# Patient Record
Sex: MALE | Race: WHITE | NOT HISPANIC OR LATINO | Employment: FULL TIME | URBAN - METROPOLITAN AREA
[De-identification: names, ages, dates, MRNs, and addresses within clinical notes are randomized per-mention and may not be internally consistent; named-entity substitution may affect disease eponyms.]

---

## 2021-10-07 ENCOUNTER — OFFICE VISIT (OUTPATIENT)
Dept: FAMILY MEDICINE CLINIC | Facility: CLINIC | Age: 31
End: 2021-10-07

## 2021-10-07 VITALS
OXYGEN SATURATION: 97 % | TEMPERATURE: 97.7 F | HEART RATE: 95 BPM | SYSTOLIC BLOOD PRESSURE: 148 MMHG | WEIGHT: 264.2 LBS | DIASTOLIC BLOOD PRESSURE: 91 MMHG | HEIGHT: 71 IN | BODY MASS INDEX: 36.99 KG/M2

## 2021-10-07 DIAGNOSIS — F32.A ANXIETY AND DEPRESSION: ICD-10-CM

## 2021-10-07 DIAGNOSIS — F41.9 ANXIETY AND DEPRESSION: ICD-10-CM

## 2021-10-07 DIAGNOSIS — Z23 NEED FOR INFLUENZA VACCINATION: ICD-10-CM

## 2021-10-07 DIAGNOSIS — R03.0 ELEVATED BP WITHOUT DIAGNOSIS OF HYPERTENSION: ICD-10-CM

## 2021-10-07 DIAGNOSIS — Z72.0 TOBACCO USE: Primary | ICD-10-CM

## 2021-10-07 DIAGNOSIS — Z00.00 PREVENTATIVE HEALTH CARE: ICD-10-CM

## 2021-10-07 DIAGNOSIS — F90.0 ATTENTION DEFICIT HYPERACTIVITY DISORDER (ADHD), PREDOMINANTLY INATTENTIVE TYPE: ICD-10-CM

## 2021-10-07 DIAGNOSIS — L02.91 ABSCESS: ICD-10-CM

## 2021-10-07 DIAGNOSIS — R07.89 OTHER CHEST PAIN: ICD-10-CM

## 2021-10-07 PROCEDURE — 80053 COMPREHEN METABOLIC PANEL: CPT | Performed by: NURSE PRACTITIONER

## 2021-10-07 PROCEDURE — 90471 IMMUNIZATION ADMIN: CPT | Performed by: NURSE PRACTITIONER

## 2021-10-07 PROCEDURE — 85027 COMPLETE CBC AUTOMATED: CPT | Performed by: NURSE PRACTITIONER

## 2021-10-07 PROCEDURE — 99204 OFFICE O/P NEW MOD 45 MIN: CPT | Performed by: NURSE PRACTITIONER

## 2021-10-07 PROCEDURE — 86803 HEPATITIS C AB TEST: CPT | Performed by: NURSE PRACTITIONER

## 2021-10-07 PROCEDURE — 84443 ASSAY THYROID STIM HORMONE: CPT | Performed by: NURSE PRACTITIONER

## 2021-10-07 PROCEDURE — 36415 COLL VENOUS BLD VENIPUNCTURE: CPT | Performed by: NURSE PRACTITIONER

## 2021-10-07 PROCEDURE — 90686 IIV4 VACC NO PRSV 0.5 ML IM: CPT | Performed by: NURSE PRACTITIONER

## 2021-10-07 PROCEDURE — 80061 LIPID PANEL: CPT | Performed by: NURSE PRACTITIONER

## 2021-10-07 RX ORDER — CEPHALEXIN 500 MG/1
500 CAPSULE ORAL 2 TIMES DAILY
Qty: 14 CAPSULE | Refills: 0 | Status: SHIPPED | OUTPATIENT
Start: 2021-10-07 | End: 2021-11-18

## 2021-10-07 RX ORDER — BUPROPION HYDROCHLORIDE 150 MG/1
150 TABLET ORAL DAILY
Qty: 30 TABLET | Refills: 1 | Status: SHIPPED | OUTPATIENT
Start: 2021-10-07 | End: 2021-12-02

## 2021-10-07 NOTE — PROGRESS NOTES
Chief Complaint  Skin Problem (inner thigh boil, has since popped, is draining currently.) and Establish Care (pt reports that pt's father has heart issues and asked if he could get a stress test.)    Subjective          Vasquez Stanford presents to [facility]  History of Present Illness    Patient here to establish care.     Patient's blood pressure elevated, denies known history of hypertension. He has occasional chest tightness, concerned due to father's history of heart disease which resulted in a CABG. Patient does have exertional dyspnea, denies dizziness, headache, or syncope.     Patient c/o chronic fatigue, often feeling down/depressed. He also has some anxiety. Patient reports he has been treated in the past with Prozac. Patient also treated with Adderall and Ritalin for ADHD, resulted in him abusing stimulants. He does c/o struggling with concentration, difficulty focusing at work, and feeling restless.     Patient c/o boils to inner thighs and buttocks. He reports they occur rather frequently but resolve on their own. However, his recent boil to his buttock was much larger and more painful than usual. Patient denies fever but reports he has had chills.       PHQ-9 Depression Screening  Little interest or pleasure in doing things? 2   Feeling down, depressed, or hopeless? 2   Trouble falling or staying asleep, or sleeping too much? 3   Feeling tired or having little energy? 2   Poor appetite or overeating? 0   Feeling bad about yourself - or that you are a failure or have let yourself or your family down? 0   Trouble concentrating on things, such as reading the newspaper or watching television? 2   Moving or speaking so slowly that other people could have noticed? Or the opposite - being so fidgety or restless that you have been moving around a lot more than usual? 1   Thoughts that you would be better off dead, or of hurting yourself in some way? 0   PHQ-9 Total Score 12   If  "you checked off any problems, how difficult have these problems made it for you to do your work, take care of things at home, or get along with other people? Somewhat difficult       Objective   Vital Signs:   /91 (BP Location: Left arm, Patient Position: Sitting, Cuff Size: Large Adult)   Pulse 95   Temp 97.7 °F (36.5 °C) (Infrared)   Ht 180.3 cm (71\")   Wt 120 kg (264 lb 3.2 oz)   SpO2 97%   BMI 36.85 kg/m²       Physical Exam  Constitutional:       Appearance: Normal appearance.   HENT:      Head: Normocephalic.   Cardiovascular:      Rate and Rhythm: Normal rate and regular rhythm.   Pulmonary:      Effort: Pulmonary effort is normal.      Breath sounds: Normal breath sounds.   Abdominal:      General: Abdomen is flat. Bowel sounds are normal.      Palpations: Abdomen is soft.   Musculoskeletal:         General: Normal range of motion.      Cervical back: Neck supple.      Right lower leg: No edema.      Left lower leg: No edema.   Skin:     General: Skin is warm and dry.          Neurological:      Mental Status: He is alert and oriented to person, place, and time.      Gait: Gait is intact.   Psychiatric:         Attention and Perception: Attention normal.         Mood and Affect: Mood normal.         Speech: Speech normal.        Result Review :                 Assessment and Plan    Diagnoses and all orders for this visit:    1. Tobacco use (Primary)    2. Preventative health care  Assessment & Plan:  1. Check labs  2. Flu shot    Orders:  -     CBC (No Diff)  -     Comprehensive Metabolic Panel  -     Hepatitis C Antibody  -     Lipid Panel  -     TSH    3. Other chest pain  Assessment & Plan:  1. Refer to Cardiology    Orders:  -     Ambulatory Referral to Cardiology    4. Elevated BP without diagnosis of hypertension  Assessment & Plan:  1. Monitor at home, call if consistently > 140/90      5. Anxiety and depression  Assessment & Plan:  1. Start Wellbutrin 150mg daily      6. Attention " deficit hyperactivity disorder (ADHD), predominantly inattentive type  Assessment & Plan:  1. Start Wellbutrin 150mg daily   2. Follow up in 4 weeks      7. Abscess  Assessment & Plan:  1. Warm compresses  2. Apply bacitracin to affected areas  3. Keflex 500mg BID x 7 days  4. Keep skin clean and dry        Other orders  -     FluLaval/Fluarix >6 Months ([id number])  -     cephalexin (Keflex) 500 MG capsule; Take 1 capsule by mouth 2 (Two) Times a Day.  Dispense: 14 capsule; Refill: 0  -     buPROPion XL (Wellbutrin XL) 150 MG 24 hr tablet; Take 1 tablet by mouth Daily.  Dispense: 30 tablet; Refill: 1    I spent 35 minutes caring for Vasquez on this date of service. This time includes time spent by me in the following activities:preparing for the visit, obtaining and/or reviewing a separately obtained history, performing a medically appropriate examination and/or evaluation , counseling and educating the patient/family/caregiver, ordering medications, tests, or procedures, referring and communicating with other health care professionals , documenting information in the medical record and care coordination  Follow Up   Return in about 4 weeks (around 11/4/2021).  Patient was given instructions and counseling regarding his condition or for health maintenance advice. Please see specific information pulled into the AVS if appropriate.

## 2021-10-07 NOTE — PATIENT INSTRUCTIONS
Steps to Quit Smoking  Smoking tobacco is the leading cause of preventable death. It can affect almost every organ in the body. Smoking puts you and those around you at risk for developing many serious chronic diseases. Quitting smoking can be difficult, but it is one of the best things that you can do for your health. It is never too late to quit.  How do I get ready to quit?  When you decide to quit smoking, create a plan to help you succeed. Before you quit:  · Pick a date to quit. Set a date within the next 2 weeks to give you time to prepare.  · Write down the reasons why you are quitting. Keep this list in places where you will see it often.  · Tell your family, friends, and co-workers that you are quitting. Support from your loved ones can make quitting easier.  · Talk with your health care provider about your options for quitting smoking.  · Find out what treatment options are covered by your health insurance.  · Identify people, places, things, and activities that make you want to smoke (triggers). Avoid them.  What first steps can I take to quit smoking?  · Throw away all cigarettes at home, at work, and in your car.  · Throw away smoking accessories, such as ashtrays and lighters.  · Clean your car. Make sure to empty the ashtray.  · Clean your home, including curtains and carpets.  What strategies can I use to quit smoking?  Talk with your health care provider about combining strategies, such as taking medicines while you are also receiving in-person counseling. Using these two strategies together makes you more likely to succeed in quitting than if you used either strategy on its own.  · If you are pregnant or breastfeeding, talk with your health care provider about finding counseling or other support strategies to quit smoking. Do not take medicine to help you quit smoking unless your health care provider tells you to do so.  To quit smoking:  Quit right away  · Quit smoking completely, instead of  gradually reducing how much you smoke over a period of time. Research shows that stopping smoking right away is more successful than gradually quitting.  · Attend in-person counseling to help you build problem-solving skills. You are more likely to succeed in quitting if you attend counseling sessions regularly. Even short sessions of 10 minutes can be effective.  Take medicine  You may take medicines to help you quit smoking. Some medicines require a prescription and some you can purchase over-the-counter. Medicines may have nicotine in them to replace the nicotine in cigarettes. Medicines may:  · Help to stop cravings.  · Help to relieve withdrawal symptoms.  Your health care provider may recommend:  · Nicotine patches, gum, or lozenges.  · Nicotine inhalers or sprays.  · Non-nicotine medicine that is taken by mouth.  Find resources  Find resources and support systems that can help you to quit smoking and remain smoke-free after you quit. These resources are most helpful when you use them often. They include:  · Online chats with a counselor.  · Telephone quitlines.  · Printed self-help materials.  · Support groups or group counseling.  · Text messaging programs.  · Mobile phone apps or applications. Use apps that can help you stick to your quit plan by providing reminders, tips, and encouragement. There are many free apps for mobile devices as well as websites. Examples include Quit Guide from the CDC and smokefree.gov  What things can I do to make it easier to quit?    · Reach out to your family and friends for support and encouragement. Call telephone quitlines (1-133-QUIT-NOW), reach out to support groups, or work with a counselor for support.  · Ask people who smoke to avoid smoking around you.  · Avoid places that trigger you to smoke, such as bars, parties, or smoke-break areas at work.  · Spend time with people who do not smoke.  · Lessen the stress in your life. Stress can be a smoking trigger for some  people. To lessen stress, try:  ? Exercising regularly.  ? Doing deep-breathing exercises.  ? Doing yoga.  ? Meditating.  ? Performing a body scan. This involves closing your eyes, scanning your body from head to toe, and noticing which parts of your body are particularly tense. Try to relax the muscles in those areas.  How will I feel when I quit smoking?  Day 1 to 3 weeks  Within the first 24 hours of quitting smoking, you may start to feel withdrawal symptoms. These symptoms are usually most noticeable 2-3 days after quitting, but they usually do not last for more than 2-3 weeks. You may experience these symptoms:  · Mood swings.  · Restlessness, anxiety, or irritability.  · Trouble concentrating.  · Dizziness.  · Strong cravings for sugary foods and nicotine.  · Mild weight gain.  · Constipation.  · Nausea.  · Coughing or a sore throat.  · Changes in how the medicines that you take for unrelated issues work in your body.  · Depression.  · Trouble sleeping (insomnia).  Week 3 and afterward  After the first 2-3 weeks of quitting, you may start to notice more positive results, such as:  · Improved sense of smell and taste.  · Decreased coughing and sore throat.  · Slower heart rate.  · Lower blood pressure.  · Clearer skin.  · The ability to breathe more easily.  · Fewer sick days.  Quitting smoking can be very challenging. Do not get discouraged if you are not successful the first time. Some people need to make many attempts to quit before they achieve long-term success. Do your best to stick to your quit plan, and talk with your health care provider if you have any questions or concerns.  Summary  · Smoking tobacco is the leading cause of preventable death. Quitting smoking is one of the best things that you can do for your health.  · When you decide to quit smoking, create a plan to help you succeed.  · Quit smoking right away, not slowly over a period of time.  · When you start quitting, seek help from your  health care provider, family, or friends.  This information is not intended to replace advice given to you by your health care provider. Make sure you discuss any questions you have with your health care provider.  Document Revised: 09/11/2020 Document Reviewed: 03/07/2020  Elsevier Patient Education © 2021 [employer]

## 2021-10-08 LAB
ALBUMIN SERPL-MCNC: 4.6 G/DL (ref 3.5–5.2)
ALBUMIN/GLOB SERPL: 1.6 G/DL
ALP SERPL-CCNC: 94 U/L (ref 39–117)
ALT SERPL W P-5'-P-CCNC: 23 U/L (ref 1–41)
ANION GAP SERPL CALCULATED.3IONS-SCNC: 11.7 MMOL/L (ref 5–15)
AST SERPL-CCNC: 14 U/L (ref 1–40)
BILIRUB SERPL-MCNC: 0.6 MG/DL (ref 0–1.2)
BUN SERPL-MCNC: 13 MG/DL (ref 6–20)
BUN/CREAT SERPL: 14 (ref 7–25)
CALCIUM SPEC-SCNC: 9.6 MG/DL (ref 8.6–10.5)
CHLORIDE SERPL-SCNC: 105 MMOL/L (ref 98–107)
CHOLEST SERPL-MCNC: 178 MG/DL (ref 0–200)
CO2 SERPL-SCNC: 21.3 MMOL/L (ref 22–29)
CREAT SERPL-MCNC: 0.93 MG/DL (ref 0.76–1.27)
DEPRECATED RDW RBC AUTO: 40.2 FL (ref 37–54)
ERYTHROCYTE [DISTWIDTH] IN BLOOD BY AUTOMATED COUNT: 13 % (ref 12.3–15.4)
GFR SERPL CREATININE-BSD FRML MDRD: 94 ML/MIN/1.73
GLOBULIN UR ELPH-MCNC: 2.8 GM/DL
GLUCOSE SERPL-MCNC: 114 MG/DL (ref 65–99)
HCT VFR BLD AUTO: 46.3 % (ref 37.5–51)
HCV AB SER DONR QL: NORMAL
HDLC SERPL-MCNC: 31 MG/DL (ref 40–60)
HGB BLD-MCNC: 15.4 G/DL (ref 13–17.7)
LDLC SERPL CALC-MCNC: 131 MG/DL (ref 0–100)
LDLC/HDLC SERPL: 4.2 {RATIO}
MCH RBC QN AUTO: 28.6 PG (ref 26.6–33)
MCHC RBC AUTO-ENTMCNC: 33.3 G/DL (ref 31.5–35.7)
MCV RBC AUTO: 86.1 FL (ref 79–97)
PLATELET # BLD AUTO: 313 10*3/MM3 (ref 140–450)
PMV BLD AUTO: 10.1 FL (ref 6–12)
POTASSIUM SERPL-SCNC: 4 MMOL/L (ref 3.5–5.2)
PROT SERPL-MCNC: 7.4 G/DL (ref 6–8.5)
RBC # BLD AUTO: 5.38 10*6/MM3 (ref 4.14–5.8)
SODIUM SERPL-SCNC: 138 MMOL/L (ref 136–145)
TRIGL SERPL-MCNC: 84 MG/DL (ref 0–150)
TSH SERPL DL<=0.05 MIU/L-ACNC: 1.5 UIU/ML (ref 0.27–4.2)
VLDLC SERPL-MCNC: 16 MG/DL (ref 5–40)
WBC # BLD AUTO: 11.72 10*3/MM3 (ref 3.4–10.8)

## 2021-10-08 NOTE — PROGRESS NOTES
Venipuncture Blood Specimen Collection  Venipuncture performed in RIGHT ARM by Lurdes Garcia MA with good hemostasis. Patient tolerated the procedure well without complications.   10/08/21   Lurdes Garcia MA  Injection  Injection performed in RIGHT DELTOID ON 10/7/21 by Lurdes Garcia MA. Patient tolerated the procedure well without complications.  10/08/21   Lurdes Garcia MA

## 2021-10-08 NOTE — PROGRESS NOTES
Thyroid is normal.  Mild elevation in white blood cell count, likely related to recent illness or infection.  He should take the Keflex as prescribed for the boils and we will repeat labs at follow-up.  Glucose slightly elevated at 114.  If patient was not fasting, that is an expected result.  If he was fasting, we will check an A1c at follow-up visit.  Kidney and liver function are within normal limits.  LDL slightly elevated at 131, otherwise lipid panel looks good.  Decrease fried and fatty foods and we will monitor every 1 to 2 years.

## 2021-10-08 NOTE — ASSESSMENT & PLAN NOTE
1. Warm compresses  2. Apply bacitracin to affected areas  3. Keflex 500mg BID x 7 days  4. Keep skin clean and dry

## 2021-10-26 ENCOUNTER — TELEPHONE (OUTPATIENT)
Dept: FAMILY MEDICINE CLINIC | Facility: CLINIC | Age: 31
End: 2021-10-26

## 2021-11-18 ENCOUNTER — OFFICE VISIT (OUTPATIENT)
Dept: FAMILY MEDICINE CLINIC | Facility: CLINIC | Age: 31
End: 2021-11-18

## 2021-11-18 VITALS
HEART RATE: 98 BPM | OXYGEN SATURATION: 99 % | HEIGHT: 71 IN | BODY MASS INDEX: 36.82 KG/M2 | SYSTOLIC BLOOD PRESSURE: 148 MMHG | DIASTOLIC BLOOD PRESSURE: 88 MMHG | WEIGHT: 263 LBS

## 2021-11-18 DIAGNOSIS — K21.9 GASTROESOPHAGEAL REFLUX DISEASE, UNSPECIFIED WHETHER ESOPHAGITIS PRESENT: ICD-10-CM

## 2021-11-18 DIAGNOSIS — Z12.5 SCREENING PSA (PROSTATE SPECIFIC ANTIGEN): ICD-10-CM

## 2021-11-18 DIAGNOSIS — L65.9 HAIR LOSS: ICD-10-CM

## 2021-11-18 DIAGNOSIS — D72.829 LEUKOCYTOSIS, UNSPECIFIED TYPE: ICD-10-CM

## 2021-11-18 DIAGNOSIS — F90.0 ATTENTION DEFICIT HYPERACTIVITY DISORDER (ADHD), PREDOMINANTLY INATTENTIVE TYPE: Primary | ICD-10-CM

## 2021-11-18 DIAGNOSIS — R73.9 HYPERGLYCEMIA: ICD-10-CM

## 2021-11-18 LAB
BASOPHILS # BLD AUTO: 0.04 10*3/MM3 (ref 0–0.2)
BASOPHILS NFR BLD AUTO: 0.4 % (ref 0–1.5)
DEPRECATED RDW RBC AUTO: 41.3 FL (ref 37–54)
EOSINOPHIL # BLD AUTO: 0.03 10*3/MM3 (ref 0–0.4)
EOSINOPHIL NFR BLD AUTO: 0.3 % (ref 0.3–6.2)
ERYTHROCYTE [DISTWIDTH] IN BLOOD BY AUTOMATED COUNT: 13.2 % (ref 12.3–15.4)
HCT VFR BLD AUTO: 48.8 % (ref 37.5–51)
HGB BLD-MCNC: 16.7 G/DL (ref 13–17.7)
IMM GRANULOCYTES # BLD AUTO: 0.07 10*3/MM3 (ref 0–0.05)
IMM GRANULOCYTES NFR BLD AUTO: 0.7 % (ref 0–0.5)
LYMPHOCYTES # BLD AUTO: 2.22 10*3/MM3 (ref 0.7–3.1)
LYMPHOCYTES NFR BLD AUTO: 21.2 % (ref 19.6–45.3)
MCH RBC QN AUTO: 29.4 PG (ref 26.6–33)
MCHC RBC AUTO-ENTMCNC: 34.2 G/DL (ref 31.5–35.7)
MCV RBC AUTO: 85.9 FL (ref 79–97)
MONOCYTES # BLD AUTO: 0.6 10*3/MM3 (ref 0.1–0.9)
MONOCYTES NFR BLD AUTO: 5.7 % (ref 5–12)
NEUTROPHILS NFR BLD AUTO: 7.5 10*3/MM3 (ref 1.7–7)
NEUTROPHILS NFR BLD AUTO: 71.7 % (ref 42.7–76)
NRBC BLD AUTO-RTO: 0 /100 WBC (ref 0–0.2)
PLATELET # BLD AUTO: 311 10*3/MM3 (ref 140–450)
PMV BLD AUTO: 10.3 FL (ref 6–12)
RBC # BLD AUTO: 5.68 10*6/MM3 (ref 4.14–5.8)
WBC NRBC COR # BLD: 10.46 10*3/MM3 (ref 3.4–10.8)

## 2021-11-18 PROCEDURE — 36415 COLL VENOUS BLD VENIPUNCTURE: CPT | Performed by: NURSE PRACTITIONER

## 2021-11-18 PROCEDURE — 85025 COMPLETE CBC W/AUTO DIFF WBC: CPT | Performed by: NURSE PRACTITIONER

## 2021-11-18 PROCEDURE — 83036 HEMOGLOBIN GLYCOSYLATED A1C: CPT | Performed by: NURSE PRACTITIONER

## 2021-11-18 PROCEDURE — 99214 OFFICE O/P EST MOD 30 MIN: CPT | Performed by: NURSE PRACTITIONER

## 2021-11-18 PROCEDURE — G0103 PSA SCREENING: HCPCS | Performed by: NURSE PRACTITIONER

## 2021-11-18 RX ORDER — FINASTERIDE 1 MG/1
1 TABLET, FILM COATED ORAL DAILY
Qty: 30 TABLET | Refills: 1 | Status: SHIPPED | OUTPATIENT
Start: 2021-11-18 | End: 2022-01-03 | Stop reason: SDUPTHER

## 2021-11-18 RX ORDER — OMEPRAZOLE 20 MG/1
20 CAPSULE, DELAYED RELEASE ORAL DAILY
Qty: 90 CAPSULE | Refills: 0 | Status: SHIPPED | OUTPATIENT
Start: 2021-11-18 | End: 2022-02-14

## 2021-11-18 RX ORDER — FINASTERIDE 5 MG/1
5 TABLET, FILM COATED ORAL DAILY
Qty: 30 TABLET | Refills: 1 | Status: SHIPPED | OUTPATIENT
Start: 2021-11-18 | End: 2021-11-18

## 2021-11-18 NOTE — PROGRESS NOTES
"Chief Complaint  Follow-up (4 week follow up depression/anxiety), Heartburn (wakes up choking at times from the acid ), and Alopecia (wants to talk about finasteride to help with hair loss )    Subjective          Vasquez Stanford presents to [facility]  History of Present Illness    Patient here for f/u visit.     Patient c/o worsening indigestion over the last 3-4 weeks. Patient reports that he is waking up at night \"choking on acid.\"  He has intermittent history of reflux, not typically this severe. Patient denies any known aggravating factors.     Patient also requesting to start Finasteride for balding. He reports worsening hair thinning/balding. Patient denies scaling, itching, flaking or other associated symptoms.     Patient started on Wellbutrin to treat depression/anxiety and ADHD. He is taking as prescribed with no complications and reports improvement in overall symptoms.     Objective   Vital Signs:   /88 (BP Location: Left arm, Patient Position: Sitting, Cuff Size: Large Adult)   Pulse 98   Ht 180.3 cm (71\")   Wt 119 kg (263 lb)   SpO2 99%   BMI 36.68 kg/m²       Physical Exam  Constitutional:       Appearance: Normal appearance.   HENT:      Head: Normocephalic.   Cardiovascular:      Rate and Rhythm: Normal rate and regular rhythm.   Pulmonary:      Effort: Pulmonary effort is normal.      Breath sounds: Normal breath sounds.   Abdominal:      General: Abdomen is flat. Bowel sounds are normal.      Palpations: Abdomen is soft.   Musculoskeletal:         General: Normal range of motion.      Cervical back: Neck supple.      Right lower leg: No edema.      Left lower leg: No edema.   Skin:     General: Skin is warm and dry.   Neurological:      Mental Status: He is alert and oriented to person, place, and time.      Gait: Gait is intact.   Psychiatric:         Attention and Perception: Attention normal.         Mood and Affect: Mood normal.         Speech: Speech " normal.        Result Review :     CMP    CMP 10/7/21   Glucose 114 (A)   BUN 13   Creatinine 0.93   eGFR Non African Am 94   Sodium 138   Potassium 4.0   Chloride 105   Calcium 9.6   Albumin 4.60   Total Bilirubin 0.6   Alkaline Phosphatase 94   AST (SGOT) 14   ALT (SGPT) 23   (A) Abnormal value            CBC    CBC 10/7/21   WBC 11.72 (A)   RBC 5.38   Hemoglobin 15.4   Hematocrit 46.3   MCV 86.1   MCH 28.6   MCHC 33.3   RDW 13.0   Platelets 313   (A) Abnormal value            Lipid Panel    Lipid Panel 10/7/21   Total Cholesterol 178   Triglycerides 84   HDL Cholesterol 31 (A)   VLDL Cholesterol 16   LDL Cholesterol  131 (A)   LDL/HDL Ratio 4.20   (A) Abnormal value            TSH    TSH 10/7/21   TSH 1.500                     Assessment and Plan    Diagnoses and all orders for this visit:    1. Attention deficit hyperactivity disorder (ADHD), predominantly inattentive type (Primary)  Assessment & Plan:  1. Continue Wellbutrin 150mg daily      2. Gastroesophageal reflux disease, unspecified whether esophagitis present  Assessment & Plan:  1. Start Omeprazole 20mg daily      3. Screening PSA (prostate specific antigen)  -     PSA Screen    4. Hyperglycemia  Assessment & Plan:  1. Check A1C    Orders:  -     Hemoglobin A1c    5. Leukocytosis, unspecified type  -     CBC & Differential    6. Hair loss  Assessment & Plan:  1. Check PSA  2. Start Finasteride 1mg daily      Other orders  -     omeprazole (PrilOSEC) 20 MG capsule; Take 1 capsule by mouth Daily.  Dispense: 90 capsule; Refill: 0  -     Discontinue: finasteride (Proscar) 5 MG tablet; Take 1 tablet by mouth Daily.  Dispense: 30 tablet; Refill: 1  -     finasteride (PROPECIA) 1 MG tablet; Take 1 tablet by mouth Daily.  Dispense: 30 tablet; Refill: 1    I spent 30 minutes caring for Vasquez on this date of service. This time includes time spent by me in the following activities:preparing for the visit, reviewing tests, obtaining and/or reviewing a separately  obtained history, performing a medically appropriate examination and/or evaluation , counseling and educating the patient/family/caregiver, ordering medications, tests, or procedures, documenting information in the medical record and care coordination  Follow Up   Return in about 3 months (around 2/18/2022).  Patient was given instructions and counseling regarding his condition or for health maintenance advice. Please see specific information pulled into the AVS if appropriate.

## 2021-11-19 LAB
HBA1C MFR BLD: 5.4 % (ref 3.5–5.6)
PSA SERPL-MCNC: 0.39 NG/ML (ref 0–4)

## 2021-12-02 RX ORDER — BUPROPION HYDROCHLORIDE 150 MG/1
TABLET ORAL
Qty: 30 TABLET | Refills: 1 | Status: SHIPPED | OUTPATIENT
Start: 2021-12-02 | End: 2022-01-11 | Stop reason: SDUPTHER

## 2022-01-03 RX ORDER — FINASTERIDE 1 MG/1
1 TABLET, FILM COATED ORAL DAILY
Qty: 30 TABLET | Refills: 1 | Status: SHIPPED | OUTPATIENT
Start: 2022-01-03 | End: 2022-02-21 | Stop reason: SDUPTHER

## 2022-01-11 RX ORDER — BUPROPION HYDROCHLORIDE 150 MG/1
150 TABLET ORAL DAILY
Qty: 90 TABLET | Refills: 1 | Status: SHIPPED | OUTPATIENT
Start: 2022-01-11 | End: 2022-05-24 | Stop reason: SDUPTHER

## 2022-01-14 RX ORDER — FINASTERIDE 5 MG/1
TABLET, FILM COATED ORAL DAILY
COMMUNITY
Start: 2021-12-14 | End: 2022-01-20

## 2022-01-19 NOTE — PROGRESS NOTES
Date of Office Visit: 2022  Encounter Provider: Dr. Peyman Phillips  Place of Service: [facility] [place]  Patient Name: Vasquez Stanford  :1990  Paty Quinteros APRN    Chief Complaint   Patient presents with   • Chest Pain     consult   • Shortness of Breath     History of Present Illness:    I am pleased to see Mr. Stanford in my office today as a new consultation.    As you know, patient is 31 years old white gentleman whose past medical history is significant for anxiety disorder, mild obesity, who is referred to me for symptom of palpitation and chest discomfort.    Patient reports that from last few months he is experiencing chest discomfort described as a chest tightness.  There is no correlation with exertion.  Patient also complain of palpitation described as a racing of the heartbeat.  Patient denies any dizziness or lightheadedness or syncope or presyncope.  No orthopnea or PND no leg edema noted.    Patient does not have previous history of CAD, PCI or MI.  Patient smokes 2 packs of cigarettes per day.  Patient does not abuse alcohol.  Patient has family history of for premature CAD.  His father had quadruple bypass.  Patient has history of cocaine abuse in the past.  Patient reports that he is using significant amount of caffeinated products.  He likes to drink Monster drink and also 6-8 soda.    EKG showed sinus tachycardia with heart rate of 96 bpm.    At this stage I strongly advised the patient to decrease caffeine intake.  Patient would proceed with stress echocardiography.  Patient is advised to monitor the blood pressure as his blood pressure was borderline high.  Lifestyle modification especially weight loss and exercise emphasized        Past Medical History:   Diagnosis Date   • History of hammer toe correction    • Parasitic infection     history of         Past Surgical History:   Procedure Laterality Date   • KNEE SURGERY Bilateral    • MANDIBLE FRACTURE SURGERY       x3           Current Outpatient Medications:   •  buPROPion XL (WELLBUTRIN XL) 150 MG 24 hr tablet, Take 1 tablet by mouth Daily., Disp: 90 tablet, Rfl: 1  •  finasteride (PROPECIA) 1 MG tablet, Take 1 tablet by mouth Daily., Disp: 30 tablet, Rfl: 1  •  multivitamin with minerals (MULTIVITAMIN ADULT PO), Take 1 tablet by mouth Daily., Disp: , Rfl:   •  omeprazole (PrilOSEC) 20 MG capsule, Take 1 capsule by mouth Daily., Disp: 90 capsule, Rfl: 0      Social History     Socioeconomic History   • Marital status:    Tobacco Use   • Smoking status: Current Every Day Smoker     Packs/day: 1.50     Years: 16.00     Pack years: 24.00     Types: Cigarettes     Start date: 10/7/2005   • Smokeless tobacco: Never Used   • Tobacco comment: Patient is advised to quit smoking   Vaping Use   • Vaping Use: Never used   Substance and Sexual Activity   • Alcohol use: Not Currently   • Drug use: Yes     Types: Marijuana     Comment: occasionally uses edibles, former cocaine user   • Sexual activity: Defer         Review of Systems   Constitutional: Negative for chills and fever.   HENT: Negative for ear discharge and nosebleeds.    Eyes: Negative for discharge and redness.   Cardiovascular: Positive for chest pain and palpitations. Negative for orthopnea, paroxysmal nocturnal dyspnea and syncope.   Respiratory: Positive for shortness of breath. Negative for cough and wheezing.    Endocrine: Negative for heat intolerance.   Skin: Negative for rash.   Musculoskeletal: Negative for arthritis and myalgias.   Gastrointestinal: Negative for abdominal pain, melena, nausea and vomiting.   Genitourinary: Negative for dysuria and hematuria.   Neurological: Negative for dizziness, light-headedness, numbness and tremors.   Psychiatric/Behavioral: Negative for depression. The patient is not nervous/anxious.        Procedures      ECG 12 Lead    Date/Time: 1/20/2022 5:22 PM  Performed by: Peyman Phillips MD  Authorized by: Peyman Phillips MD  "  Previous ECG: no previous ECG available  Rhythm: sinus rhythm and sinus tachycardia    Clinical impression: abnormal EKG            ECG 12 Lead    (Results Pending)           Objective:    /88   Pulse 96   Ht 180.3 cm (70.98\")   Wt 119 kg (263 lb)   BMI 36.70 kg/m²         Constitutional:       Appearance: Well-developed.   Eyes:      General: No scleral icterus.        Right eye: No discharge.   HENT:      Head: Normocephalic and atraumatic.   Neck:      Thyroid: No thyromegaly.      Lymphadenopathy: No cervical adenopathy.   Pulmonary:      Effort: Pulmonary effort is normal. No respiratory distress.      Breath sounds: Normal breath sounds. No wheezing. No rales.   Cardiovascular:      Normal rate. Regular rhythm.      No gallop.   Abdominal:      Tenderness: There is no abdominal tenderness.   Skin:     Findings: No erythema or rash.   Neurological:      Mental Status: Alert and oriented to person, place, and time.             Assessment:       Diagnosis Plan   1. Other chest pain  ECG 12 Lead    Adult Stress Echo W/ Cont or Stress Agent if Necessary Per Protocol   2. Shortness of breath  ECG 12 Lead    Adult Stress Echo W/ Cont or Stress Agent if Necessary Per Protocol   3. Tachycardia              Plan:       MDM:    1.  Tachycardia:    Patient has sinus tachycardia.  Most likely cause is high intake of caffeine.  Patient is advised to decrease the caffeinated products.  Patient may need beta-blocker in future    2.  Elevated blood pressure without diagnosis of hypertension:    Patient is advised to decrease caffeine intake.  Blood pressure monitoring is recommended.  Patient is advised to bring the logbook    3.  Shortness of breath:    Consider stress echocardiography    4.  Chest discomfort:    I would recommend to proceed with stress test with echocardiography  "

## 2022-01-20 ENCOUNTER — OFFICE VISIT (OUTPATIENT)
Dept: CARDIOLOGY | Facility: CLINIC | Age: 32
End: 2022-01-20

## 2022-01-20 VITALS
WEIGHT: 263 LBS | HEIGHT: 71 IN | HEART RATE: 96 BPM | DIASTOLIC BLOOD PRESSURE: 88 MMHG | SYSTOLIC BLOOD PRESSURE: 132 MMHG | BODY MASS INDEX: 36.82 KG/M2

## 2022-01-20 DIAGNOSIS — R06.02 SHORTNESS OF BREATH: ICD-10-CM

## 2022-01-20 DIAGNOSIS — R07.89 OTHER CHEST PAIN: Primary | ICD-10-CM

## 2022-01-20 DIAGNOSIS — R00.0 TACHYCARDIA: ICD-10-CM

## 2022-01-20 PROCEDURE — 99204 OFFICE O/P NEW MOD 45 MIN: CPT | Performed by: INTERNAL MEDICINE

## 2022-01-20 PROCEDURE — 93000 ELECTROCARDIOGRAM COMPLETE: CPT | Performed by: INTERNAL MEDICINE

## 2022-01-20 RX ORDER — MULTIPLE VITAMINS W/ MINERALS TAB 9MG-400MCG
1 TAB ORAL DAILY
COMMUNITY

## 2022-01-24 RX ORDER — FINASTERIDE 5 MG/1
TABLET, FILM COATED ORAL
Qty: 30 TABLET | Refills: 1 | OUTPATIENT
Start: 2022-01-24

## 2022-01-25 ENCOUNTER — CLINICAL SUPPORT (OUTPATIENT)
Dept: FAMILY MEDICINE CLINIC | Facility: CLINIC | Age: 32
End: 2022-01-25

## 2022-01-25 VITALS — SYSTOLIC BLOOD PRESSURE: 148 MMHG | DIASTOLIC BLOOD PRESSURE: 98 MMHG

## 2022-02-08 RX ORDER — BUPROPION HYDROCHLORIDE 150 MG/1
TABLET ORAL
Qty: 30 TABLET | OUTPATIENT
Start: 2022-02-08

## 2022-02-14 RX ORDER — OMEPRAZOLE 20 MG/1
CAPSULE, DELAYED RELEASE ORAL
Qty: 90 CAPSULE | Refills: 0 | Status: SHIPPED | OUTPATIENT
Start: 2022-02-14 | End: 2022-05-12

## 2022-02-21 ENCOUNTER — TELEPHONE (OUTPATIENT)
Dept: CARDIOLOGY | Facility: CLINIC | Age: 32
End: 2022-02-21

## 2022-02-21 RX ORDER — FINASTERIDE 1 MG/1
1 TABLET, FILM COATED ORAL DAILY
Qty: 30 TABLET | Refills: 1 | Status: SHIPPED | OUTPATIENT
Start: 2022-02-21 | End: 2022-05-24 | Stop reason: SDUPTHER

## 2022-02-21 NOTE — TELEPHONE ENCOUNTER
Per Dr Phillips patient can not afford stress echo get patient in this week for a treadmill here in this office before treadmill leaves the office

## 2022-02-22 DIAGNOSIS — R07.89 OTHER CHEST PAIN: Primary | ICD-10-CM

## 2022-03-08 ENCOUNTER — APPOINTMENT (OUTPATIENT)
Dept: CARDIOLOGY | Facility: HOSPITAL | Age: 32
End: 2022-03-08

## 2022-03-09 NOTE — PROGRESS NOTES
Date of Office Visit: 03/10/2022  Encounter Provider: Dr. Peyman Phillips  Place of Service: [facility] CARDIOLOGY [place]  Patient Name: Vasquez Stanford  :1990  Paty Quinteros APRN    Chief Complaint   Patient presents with   • Chest Pain   • Shortness of Breath   • follow up stress test     History of Present Illness    I am pleased to see Mr. Stanford in my office today as a follow-up    As you know, patient is 31 years old white gentleman whose past medical history is significant for anxiety disorder, mild obesity, who came today for follow-up.    In 2022, patient was presented to me for symptom of palpitation and chest discomfort.  I recommended to proceed with stress echocardiography but insurance did not approve it.  Patient underwent treadmill exercise stress test.  He walked for total of 8 minutes and no ST changes were noted.  Echocardiogram could not be done.    Patient came today for 6-week follow-up.  He brought the blood pressure logbook and most of the blood pressures are in the range of 140 to 150 mm systolic.  Patient still have palpitation especially on exertion.  Patient denies any chest pain.  No orthopnea PND.  Patient complain of fatigue.  Patient wakes up tired and fatigued.  Patient does admit to snoring.  He is not aware of sleep apnea.    At this stage I would start Bystolic 10 mg daily.  His blood pressure needs to be controlled.  I suspect he may have underlying sleep apnea.  Patient is advised to monitor his symptom of sleep apnea through his wife at home.  If needed I would recommend sleep studies.  Blood pressure monitoring is recommended at home.    Consider echocardiogram in future        Past Medical History:   Diagnosis Date   • History of hammer toe correction    • Parasitic infection     history of         Past Surgical History:   Procedure Laterality Date   • KNEE SURGERY Bilateral    • MANDIBLE FRACTURE SURGERY      x3           Current Outpatient Medications:    •  buPROPion XL (WELLBUTRIN XL) 150 MG 24 hr tablet, Take 1 tablet by mouth Daily., Disp: 90 tablet, Rfl: 1  •  finasteride (PROPECIA) 1 MG tablet, Take 1 tablet by mouth Daily., Disp: 30 tablet, Rfl: 1  •  multivitamin with minerals tablet tablet, Take 1 tablet by mouth Daily., Disp: , Rfl:   •  omeprazole (priLOSEC) 20 MG capsule, TAKE ONE CAPSULE BY MOUTH DAILY, Disp: 90 capsule, Rfl: 0  •  nebivolol (Bystolic) 10 MG tablet, Take 1 tablet by mouth Daily., Disp: 90 tablet, Rfl: 1      Social History     Socioeconomic History   • Marital status:    Tobacco Use   • Smoking status: Current Every Day Smoker     Packs/day: 1.50     Years: 16.00     Pack years: 24.00     Types: Cigarettes     Start date: 10/7/2005   • Smokeless tobacco: Never Used   • Tobacco comment: Patient is advised to quit smoking   Vaping Use   • Vaping Use: Never used   Substance and Sexual Activity   • Alcohol use: Not Currently   • Drug use: Yes     Types: Marijuana     Comment: occasionally uses edibles, former cocaine user   • Sexual activity: Defer         Review of Systems   Constitutional: Positive for malaise/fatigue. Negative for chills and fever.   HENT: Negative for ear discharge and nosebleeds.    Eyes: Negative for discharge and redness.   Cardiovascular: Negative for chest pain, orthopnea, palpitations, paroxysmal nocturnal dyspnea and syncope.   Respiratory: Positive for shortness of breath. Negative for cough and wheezing.    Endocrine: Negative for heat intolerance.   Skin: Negative for rash.   Musculoskeletal: Negative for arthritis and myalgias.   Gastrointestinal: Negative for abdominal pain, melena, nausea and vomiting.   Genitourinary: Negative for dysuria and hematuria.   Neurological: Negative for dizziness, light-headedness, numbness and tremors.   Psychiatric/Behavioral: Negative for depression. The patient is not nervous/anxious.        Procedures    Procedures    No orders to display           Objective:    BP  "146/98   Pulse 98   Ht 180.3 cm (70.98\")   Wt 119 kg (263 lb)   BMI 36.70 kg/m²         Constitutional:       Appearance: Well-developed.   Eyes:      General: No scleral icterus.        Right eye: No discharge.   HENT:      Head: Normocephalic and atraumatic.   Neck:      Thyroid: No thyromegaly.      Lymphadenopathy: No cervical adenopathy.   Pulmonary:      Effort: Pulmonary effort is normal. No respiratory distress.      Breath sounds: Normal breath sounds. No wheezing. No rales.   Cardiovascular:      Normal rate. Regular rhythm.      No gallop.   Abdominal:      Tenderness: There is no abdominal tenderness.   Skin:     Findings: No erythema or rash.   Neurological:      Mental Status: Alert and oriented to person, place, and time.             Assessment:       Diagnosis Plan   1. Other chest pain  nebivolol (Bystolic) 10 MG tablet   2. Palpitations  nebivolol (Bystolic) 10 MG tablet   3. Shortness of breath  nebivolol (Bystolic) 10 MG tablet   4. Essential hypertension  nebivolol (Bystolic) 10 MG tablet            Plan:       MDM:    1.  Shortness of breath:    I would consider echocardiogram again in future.    2.  Essential hypertension:    I would start Bystolic 10 mg daily blood pressure monitoring is recommended    3.  Palpitation:    Start beta-blockers    4.  Chest pain:    His stress test is negative recommend observation  "

## 2022-03-10 ENCOUNTER — OFFICE VISIT (OUTPATIENT)
Dept: CARDIOLOGY | Facility: CLINIC | Age: 32
End: 2022-03-10

## 2022-03-10 VITALS
BODY MASS INDEX: 36.82 KG/M2 | HEIGHT: 71 IN | WEIGHT: 263 LBS | DIASTOLIC BLOOD PRESSURE: 98 MMHG | HEART RATE: 98 BPM | SYSTOLIC BLOOD PRESSURE: 146 MMHG

## 2022-03-10 DIAGNOSIS — R00.2 PALPITATIONS: ICD-10-CM

## 2022-03-10 DIAGNOSIS — I10 ESSENTIAL HYPERTENSION: ICD-10-CM

## 2022-03-10 DIAGNOSIS — R06.02 SHORTNESS OF BREATH: ICD-10-CM

## 2022-03-10 DIAGNOSIS — R07.89 OTHER CHEST PAIN: Primary | ICD-10-CM

## 2022-03-10 PROCEDURE — 99214 OFFICE O/P EST MOD 30 MIN: CPT | Performed by: INTERNAL MEDICINE

## 2022-03-10 RX ORDER — NEBIVOLOL 10 MG/1
10 TABLET ORAL DAILY
Qty: 90 TABLET | Refills: 1 | Status: SHIPPED | OUTPATIENT
Start: 2022-03-10 | End: 2022-03-11

## 2022-03-11 ENCOUNTER — TELEPHONE (OUTPATIENT)
Dept: CARDIOLOGY | Facility: CLINIC | Age: 32
End: 2022-03-11

## 2022-03-11 RX ORDER — METOPROLOL SUCCINATE 25 MG/1
25 TABLET, EXTENDED RELEASE ORAL DAILY
Qty: 90 TABLET | Refills: 1 | Status: SHIPPED | OUTPATIENT
Start: 2022-03-11 | End: 2022-07-26 | Stop reason: SDUPTHER

## 2022-03-11 NOTE — TELEPHONE ENCOUNTER
Vasquez Stanford Key: [id number] - PA Case ID: [id number] - Rx #: [id number]   Drug  Nebivolol HCl 10MG tablets       Need help? Call us at [phone]  Outcome  Denied today    Must try one of these first.  Health Plan’s Preferred Products    ATENOLOL   METOPROLOL TARTRATE   CARVEDILOL

## 2022-05-12 RX ORDER — OMEPRAZOLE 20 MG/1
CAPSULE, DELAYED RELEASE ORAL
Qty: 90 CAPSULE | Refills: 0 | Status: SHIPPED | OUTPATIENT
Start: 2022-05-12 | End: 2022-05-24 | Stop reason: SDUPTHER

## 2022-05-24 RX ORDER — BUPROPION HYDROCHLORIDE 150 MG/1
150 TABLET ORAL DAILY
Qty: 90 TABLET | Refills: 1 | Status: SHIPPED | OUTPATIENT
Start: 2022-05-24 | End: 2022-10-11

## 2022-05-24 RX ORDER — FINASTERIDE 1 MG/1
1 TABLET, FILM COATED ORAL DAILY
Qty: 30 TABLET | Refills: 1 | Status: SHIPPED | OUTPATIENT
Start: 2022-05-24 | End: 2022-08-31

## 2022-05-24 RX ORDER — OMEPRAZOLE 20 MG/1
20 CAPSULE, DELAYED RELEASE ORAL DAILY
Qty: 90 CAPSULE | Refills: 0 | Status: SHIPPED | OUTPATIENT
Start: 2022-05-24 | End: 2022-08-31

## 2022-06-28 RX ORDER — EPINEPHRINE 0.3 MG/.3ML
0.3 INJECTION SUBCUTANEOUS ONCE
Qty: 1 EACH | Refills: 0 | Status: SHIPPED | OUTPATIENT
Start: 2022-06-28 | End: 2022-08-31

## 2022-07-26 RX ORDER — METOPROLOL SUCCINATE 25 MG/1
25 TABLET, EXTENDED RELEASE ORAL DAILY
Qty: 90 TABLET | Refills: 1 | Status: SHIPPED | OUTPATIENT
Start: 2022-07-26

## 2022-08-31 RX ORDER — EPINEPHRINE 0.3 MG/.3ML
INJECTION SUBCUTANEOUS
Qty: 2 EACH | Refills: 0 | Status: SHIPPED | OUTPATIENT
Start: 2022-08-31

## 2022-08-31 RX ORDER — FINASTERIDE 1 MG/1
TABLET, FILM COATED ORAL
Qty: 30 TABLET | Refills: 1 | Status: SHIPPED | OUTPATIENT
Start: 2022-08-31 | End: 2022-12-20

## 2022-08-31 RX ORDER — OMEPRAZOLE 20 MG/1
CAPSULE, DELAYED RELEASE ORAL
Qty: 90 CAPSULE | Refills: 0 | Status: SHIPPED | OUTPATIENT
Start: 2022-08-31

## 2022-09-21 RX ORDER — ATOVAQUONE AND PROGUANIL HYDROCHLORIDE 250; 100 MG/1; MG/1
TABLET, FILM COATED ORAL
COMMUNITY
Start: 2022-08-16 | End: 2022-09-22

## 2022-09-22 ENCOUNTER — OFFICE VISIT (OUTPATIENT)
Dept: CARDIOLOGY | Facility: CLINIC | Age: 32
End: 2022-09-22

## 2022-09-22 VITALS
SYSTOLIC BLOOD PRESSURE: 132 MMHG | WEIGHT: 273 LBS | OXYGEN SATURATION: 99 % | BODY MASS INDEX: 38.22 KG/M2 | HEART RATE: 90 BPM | HEIGHT: 71 IN | DIASTOLIC BLOOD PRESSURE: 84 MMHG

## 2022-09-22 DIAGNOSIS — E78.5 DYSLIPIDEMIA (HIGH LDL; LOW HDL): ICD-10-CM

## 2022-09-22 DIAGNOSIS — I10 ESSENTIAL HYPERTENSION: Primary | ICD-10-CM

## 2022-09-22 PROCEDURE — 99213 OFFICE O/P EST LOW 20 MIN: CPT | Performed by: NURSE PRACTITIONER

## 2022-09-30 PROBLEM — I10 ESSENTIAL HYPERTENSION: Status: ACTIVE | Noted: 2022-09-30

## 2022-09-30 PROCEDURE — 93000 ELECTROCARDIOGRAM COMPLETE: CPT | Performed by: NURSE PRACTITIONER

## 2022-09-30 NOTE — PROGRESS NOTES
Cardiology Office Follow Up Visit      Primary Care Provider:  Paty Quinteros APRN    Reason for f/u:     Chest pain  Shortness of breath      Subjective     CC:    Denies chest pain or dyspnea    History of Present Illness       Vasquez Stanford is a 32 y.o. male.  Patient is a 32-year-old male who is known to have anxiety disorder.  He was previously evaluated by Dr. Alan silver in January 2022.  Stress echo was ordered but his insurance did not approve it.  He underwent an exercise treadmill stress test.  He completed 8 minutes of Cory protocol and had no EKG changes.    His blood pressures have been much better controlled recently.  He reports no recurrent chest pain or dyspnea.  He has been compliant with medical therapy          ASSESSMENT/PLAN:      Diagnoses and all orders for this visit:    1. Essential hypertension (Primary)  Comments:  Stable on current medical therapy    2. Dyslipidemia (high LDL; low HDL)  Comments:  We will recheck lipid panel  Orders:  -     ECG 12 Lead  -     Lipid Panel; Future            MEDICAL DECISION MAKING:    Patient appears well compensated.  Blood pressure stable on current medical therapy.  He is not having recurrent signs to suggest unstable angina.  I made no changes in his medication.  We will recheck a lipid panel to assure its at goal.    Continue aggressive risk factor modification.  Tobacco cessation recommended.        Past Medical History:   Diagnosis Date   • History of hammer toe correction    • Parasitic infection     history of       Past Surgical History:   Procedure Laterality Date   • KNEE SURGERY Bilateral    • MANDIBLE FRACTURE SURGERY      x3         Current Outpatient Medications:   •  buPROPion XL (WELLBUTRIN XL) 150 MG 24 hr tablet, Take 1 tablet by mouth Daily., Disp: 90 tablet, Rfl: 1  •  EPINEPHrine (EPIPEN) 0.3 MG/0.3ML solution auto-injector injection, INJECT INTO THE MIDDLE OF THE OUTER THIGH AND HOLD FOR 3 SECONDS AS NEEDED FOR SEVERE ALLERGIC  REACTION THEN CALL 911 IF USED., Disp: 2 each, Rfl: 0  •  finasteride (PROPECIA) 1 MG tablet, TAKE ONE TABLET BY MOUTH DAILY, Disp: 30 tablet, Rfl: 1  •  metoprolol succinate XL (TOPROL-XL) 25 MG 24 hr tablet, Take 1 tablet by mouth Daily., Disp: 90 tablet, Rfl: 1  •  multivitamin with minerals tablet tablet, Take 1 tablet by mouth Daily., Disp: , Rfl:   •  omeprazole (priLOSEC) 20 MG capsule, TAKE ONE CAPSULE BY MOUTH DAILY, Disp: 90 capsule, Rfl: 0    Social History     Socioeconomic History   • Marital status:    Tobacco Use   • Smoking status: Current Every Day Smoker     Packs/day: 1.50     Years: 16.00     Pack years: 24.00     Types: Cigarettes     Start date: 10/7/2005   • Smokeless tobacco: Never Used   • Tobacco comment: Patient is advised to quit smoking with   Vaping Use   • Vaping Use: Never used   Substance and Sexual Activity   • Alcohol use: Not Currently   • Drug use: Yes     Types: Marijuana     Comment: occasionally uses edibles, former cocaine user   • Sexual activity: Defer       Family History   Problem Relation Age of Onset   • Diabetes Mother    • COPD Mother    • Heart disease Father    • Heart valve disorder Father    • No Known Problems Sister    • No Known Problems Brother    • No Known Problems Maternal Aunt    • No Known Problems Maternal Uncle    • Colon cancer Paternal Uncle    • No Known Problems Maternal Grandmother    • No Known Problems Maternal Grandfather    • No Known Problems Paternal Grandmother    • No Known Problems Paternal Grandfather    • Heart attack Paternal Uncle        The following portions of the patient's history were reviewed and updated as appropriate: allergies, current medications, past family history, past medical history, past social history, past surgical history and problem list.    Review of Systems   Constitutional: Negative for decreased appetite and diaphoresis.   HENT: Negative for congestion, hearing loss and nosebleeds.    Cardiovascular:  "Negative for chest pain, claudication, dyspnea on exertion, irregular heartbeat, leg swelling, near-syncope, orthopnea, palpitations, paroxysmal nocturnal dyspnea and syncope.   Respiratory: Negative for cough, shortness of breath and sleep disturbances due to breathing.    Endocrine: Negative for polyuria.   Hematologic/Lymphatic: Does not bruise/bleed easily.   Skin: Negative for itching and rash.   Musculoskeletal: Negative for back pain, muscle weakness and myalgias.   Gastrointestinal: Negative for abdominal pain, change in bowel habit and nausea.   Genitourinary: Negative for dysuria, flank pain, frequency and hesitancy.   Neurological: Negative for dizziness, tremors and weakness.   Psychiatric/Behavioral: Negative for altered mental status. The patient does not have insomnia.      /84 (BP Location: Left arm, Patient Position: Sitting, Cuff Size: Large Adult)   Pulse 90   Ht 180.3 cm (70.98\")   Wt 124 kg (273 lb)   SpO2 99%   BMI 38.09 kg/m² .  Objective     Vitals reviewed.   Constitutional:       General: Not in acute distress.     Appearance: Normal appearance. Well-developed.   Eyes:      Pupils: Pupils are equal, round, and reactive to light.   HENT:      Head: Normocephalic and atraumatic.   Neck:      Vascular: No JVD.   Pulmonary:      Effort: Pulmonary effort is normal.      Breath sounds: Normal breath sounds.   Cardiovascular:      Normal rate. Regular rhythm.   Pulses:     Intact distal pulses.   Edema:     Peripheral edema absent.   Abdominal:      General: There is no distension.      Palpations: Abdomen is soft.      Tenderness: There is no abdominal tenderness.   Musculoskeletal: Normal range of motion.      Cervical back: Normal range of motion and neck supple. Skin:     General: Skin is warm and dry.   Neurological:      Mental Status: Alert and oriented to person, place, and time.             ECG 12 Lead    Date/Time: 9/30/2022 2:26 PM  Performed by: Aranza Milton, " APRN  Authorized by: Aranza Milton APRN   Comparison: compared with previous ECG   Similar to previous ECG  Rhythm: sinus rhythm  Rate: normal  BPM: 90  Conduction: conduction normal  QRS axis: normal  Other: no other findings    Clinical impression: normal ECG            EKG ordered by and reviewed by me in office

## 2022-10-11 RX ORDER — BUPROPION HYDROCHLORIDE 150 MG/1
TABLET ORAL
Qty: 90 TABLET | Refills: 1 | Status: SHIPPED | OUTPATIENT
Start: 2022-10-11

## 2022-12-20 RX ORDER — FINASTERIDE 1 MG/1
TABLET, FILM COATED ORAL
Qty: 30 TABLET | Refills: 1 | Status: SHIPPED | OUTPATIENT
Start: 2022-12-20 | End: 2023-02-24 | Stop reason: SDUPTHER

## 2023-02-24 RX ORDER — FINASTERIDE 1 MG/1
1 TABLET, FILM COATED ORAL DAILY
Qty: 30 TABLET | Refills: 1 | Status: SHIPPED | OUTPATIENT
Start: 2023-02-24

## 2023-04-21 ENCOUNTER — TELEPHONE (OUTPATIENT)
Dept: FAMILY MEDICINE CLINIC | Facility: CLINIC | Age: 33
End: 2023-04-21
Payer: COMMERCIAL

## 2023-04-21 NOTE — TELEPHONE ENCOUNTER
Attempted to call pt to confirm 4/25 appt, no answer: per verbal left msg for pt to call office to confirm appt

## 2023-04-25 ENCOUNTER — OFFICE VISIT (OUTPATIENT)
Dept: FAMILY MEDICINE CLINIC | Facility: CLINIC | Age: 33
End: 2023-04-25
Payer: COMMERCIAL

## 2023-04-25 VITALS
BODY MASS INDEX: 37.66 KG/M2 | WEIGHT: 269 LBS | HEIGHT: 71 IN | OXYGEN SATURATION: 98 % | DIASTOLIC BLOOD PRESSURE: 80 MMHG | HEART RATE: 78 BPM | SYSTOLIC BLOOD PRESSURE: 128 MMHG

## 2023-04-25 DIAGNOSIS — L64.9 MALE PATTERN BALDNESS: ICD-10-CM

## 2023-04-25 DIAGNOSIS — K21.9 GASTROESOPHAGEAL REFLUX DISEASE, UNSPECIFIED WHETHER ESOPHAGITIS PRESENT: ICD-10-CM

## 2023-04-25 DIAGNOSIS — L98.9 SKIN LESION OF RIGHT ARM: ICD-10-CM

## 2023-04-25 DIAGNOSIS — Z12.5 SCREENING PSA (PROSTATE SPECIFIC ANTIGEN): ICD-10-CM

## 2023-04-25 DIAGNOSIS — Z00.00 PREVENTATIVE HEALTH CARE: ICD-10-CM

## 2023-04-25 DIAGNOSIS — F32.A ANXIETY AND DEPRESSION: ICD-10-CM

## 2023-04-25 DIAGNOSIS — I10 PRIMARY HYPERTENSION: ICD-10-CM

## 2023-04-25 DIAGNOSIS — G43.709 CHRONIC MIGRAINE WITHOUT AURA WITHOUT STATUS MIGRAINOSUS, NOT INTRACTABLE: ICD-10-CM

## 2023-04-25 DIAGNOSIS — F90.0 ATTENTION DEFICIT HYPERACTIVITY DISORDER (ADHD), PREDOMINANTLY INATTENTIVE TYPE: Primary | ICD-10-CM

## 2023-04-25 DIAGNOSIS — F41.9 ANXIETY AND DEPRESSION: ICD-10-CM

## 2023-04-25 PROCEDURE — 85027 COMPLETE CBC AUTOMATED: CPT | Performed by: NURSE PRACTITIONER

## 2023-04-25 PROCEDURE — 80061 LIPID PANEL: CPT | Performed by: NURSE PRACTITIONER

## 2023-04-25 PROCEDURE — 83036 HEMOGLOBIN GLYCOSYLATED A1C: CPT | Performed by: NURSE PRACTITIONER

## 2023-04-25 PROCEDURE — 80053 COMPREHEN METABOLIC PANEL: CPT | Performed by: NURSE PRACTITIONER

## 2023-04-25 PROCEDURE — G0103 PSA SCREENING: HCPCS | Performed by: NURSE PRACTITIONER

## 2023-04-25 RX ORDER — SUMATRIPTAN 25 MG/1
TABLET, FILM COATED ORAL
Qty: 8 TABLET | Refills: 1 | Status: SHIPPED | OUTPATIENT
Start: 2023-04-25

## 2023-04-25 RX ORDER — METOPROLOL SUCCINATE 25 MG/1
25 TABLET, EXTENDED RELEASE ORAL DAILY
Qty: 90 TABLET | Refills: 1 | Status: SHIPPED | OUTPATIENT
Start: 2023-04-25

## 2023-04-25 RX ORDER — OMEPRAZOLE 20 MG/1
20 CAPSULE, DELAYED RELEASE ORAL DAILY
Qty: 90 CAPSULE | Refills: 0 | Status: SHIPPED | OUTPATIENT
Start: 2023-04-25

## 2023-04-25 RX ORDER — FINASTERIDE 1 MG/1
1 TABLET, FILM COATED ORAL DAILY
Qty: 30 TABLET | Refills: 1 | Status: SHIPPED | OUTPATIENT
Start: 2023-04-25

## 2023-04-25 RX ORDER — BUPROPION HYDROCHLORIDE 150 MG/1
150 TABLET ORAL DAILY
Qty: 90 TABLET | Refills: 1 | Status: SHIPPED | OUTPATIENT
Start: 2023-04-25

## 2023-04-25 NOTE — ASSESSMENT & PLAN NOTE
1. Appears benign - advised he monitor for changes in size/color   2. Discussed Dermatology referral

## 2023-04-25 NOTE — PROGRESS NOTES
"Chief Complaint  Follow-up (Needing refills on medications has not been seen since 2021) and Mass (Bump on right arm that has concerns about )    Subjective        Vasquez Stanford presents to [facility]  History of Present Illness    Patient presents for follow-up visit.    Patient has area to his right arm, wanting checked. Area present for at least one year, denies change in size or color - no pain or itching.      Patient is taking Propecia 1 mg daily for male pattern baldness. He reports hair loss has stopped.     Patient has history of depression, anxiety and ADHD.  He is prescribed Wellbutrin 150 mg daily. Patient reports symptoms well controlled, has no acute complaints.     Patient is followed by cardiology regarding history of palpitations and chest discomfort.  He is taking metoprolol XL 25 mg daily.  Blood pressure is stable.  Patient denies dizziness, chest pain, cough or dyspnea.     Patient does report migraine headaches, occurring 1-2 times monthly - often stress induced. He takes Tylenol PRN, takes the edge off.     Patient is taking Prilosec 20 mg daily for GERD. Symptoms stable, patient has no acute complaints.     Objective   Vital Signs:  /80 (BP Location: Left arm, Patient Position: Sitting, Cuff Size: Large Adult)   Pulse 78   Ht 180.3 cm (71\")   Wt 122 kg (269 lb)   SpO2 98%   BMI 37.52 kg/m²   Estimated body mass index is 37.52 kg/m² as calculated from the following:    Height as of this encounter: 180.3 cm (71\").    Weight as of this encounter: 122 kg (269 lb).       Class 2 Severe Obesity (BMI >=35 and <=39.9). Obesity-related health conditions include the following: hypertension and GERD. Obesity is unchanged. BMI is is above average; BMI management plan is completed. We discussed portion control, increasing exercise, management of depression/anxiety/stress to control compensatory eating and decreasing alcohol consumption.      Physical " Exam  Constitutional:       Appearance: Normal appearance. He is obese.   HENT:      Head: Normocephalic.   Cardiovascular:      Rate and Rhythm: Normal rate and regular rhythm.   Pulmonary:      Effort: Pulmonary effort is normal.      Breath sounds: Normal breath sounds.   Abdominal:      General: Abdomen is flat. Bowel sounds are normal.      Palpations: Abdomen is soft.   Musculoskeletal:         General: Normal range of motion.      Cervical back: Neck supple.      Right lower leg: No edema.      Left lower leg: No edema.   Skin:     General: Skin is warm and dry.   Neurological:      Mental Status: He is alert and oriented to person, place, and time.      Gait: Gait is intact.   Psychiatric:         Attention and Perception: Attention normal.         Mood and Affect: Mood normal.         Speech: Speech normal.        Result Review :                   Assessment and Plan   Diagnoses and all orders for this visit:    1. Attention deficit hyperactivity disorder (ADHD), predominantly inattentive type (Primary)    2. Gastroesophageal reflux disease, unspecified whether esophagitis present  Assessment & Plan:  1.  Stable on Prilosec 20 mg daily      3. Screening PSA (prostate specific antigen)  -     PSA Screen    4. Primary hypertension  Assessment & Plan:  1.  Blood pressure at goal of less than 140/90  2.  Continue metoprolol as prescribed      5. Preventative health care  -     CBC (No Diff)  -     Comprehensive Metabolic Panel  -     Hemoglobin A1c  -     Lipid Panel    6. Chronic migraine without aura without status migrainosus, not intractable  Assessment & Plan:  1.  Start Imitrex 25 mg as needed for migraine headache        7. Male pattern baldness  Assessment & Plan:  1.  Continue Propecia as prescribed  2.  Check PSA      8. Anxiety and depression  Assessment & Plan:  1.  Stable on Wellbutrin 150 mg daily      9. Skin lesion of right arm  Assessment & Plan:  1. Appears benign - advised he monitor for  changes in size/color   2. Discussed Dermatology referral      Other orders  -     buPROPion XL (WELLBUTRIN XL) 150 MG 24 hr tablet; Take 1 tablet by mouth Daily.  Dispense: 90 tablet; Refill: 1  -     omeprazole (priLOSEC) 20 MG capsule; Take 1 capsule by mouth Daily.  Dispense: 90 capsule; Refill: 0  -     metoprolol succinate XL (TOPROL-XL) 25 MG 24 hr tablet; Take 1 tablet by mouth Daily.  Dispense: 90 tablet; Refill: 1  -     finasteride (PROPECIA) 1 MG tablet; Take 1 tablet by mouth Daily.  Dispense: 30 tablet; Refill: 1  -     SUMAtriptan (Imitrex) 25 MG tablet; Take one tablet at onset of headache. May repeat dose one time in 2 hours if headache not relieved.  Dispense: 8 tablet; Refill: 1         I spent 30 minutes caring for Vasquez on this date of service. This time includes time spent by me in the following activities:preparing for the visit, reviewing tests, obtaining and/or reviewing a separately obtained history, performing a medically appropriate examination and/or evaluation , counseling and educating the patient/family/caregiver, ordering medications, tests, or procedures, documenting information in the medical record and care coordination  Follow Up   Return in about 6 months (around 10/25/2023) for HTN/Depression.  Patient was given instructions and counseling regarding his condition or for health maintenance advice. Please see specific information pulled into the AVS if appropriate.

## 2023-04-25 NOTE — PROGRESS NOTES
Venipuncture Blood Specimen Collection  Venipuncture performed in the right arm by Sue Asif MA with good hemostasis. Patient tolerated the procedure well without complications.   04/25/23   Sue Asif MA

## 2023-04-26 LAB
ALBUMIN SERPL-MCNC: 4.7 G/DL (ref 3.5–5.2)
ALBUMIN/GLOB SERPL: 1.8 G/DL
ALP SERPL-CCNC: 91 U/L (ref 39–117)
ALT SERPL W P-5'-P-CCNC: 37 U/L (ref 1–41)
ANION GAP SERPL CALCULATED.3IONS-SCNC: 11 MMOL/L (ref 5–15)
AST SERPL-CCNC: 20 U/L (ref 1–40)
BILIRUB SERPL-MCNC: 0.3 MG/DL (ref 0–1.2)
BUN SERPL-MCNC: 12 MG/DL (ref 6–20)
BUN/CREAT SERPL: 11 (ref 7–25)
CALCIUM SPEC-SCNC: 10.2 MG/DL (ref 8.6–10.5)
CHLORIDE SERPL-SCNC: 107 MMOL/L (ref 98–107)
CHOLEST SERPL-MCNC: 182 MG/DL (ref 0–200)
CO2 SERPL-SCNC: 24 MMOL/L (ref 22–29)
CREAT SERPL-MCNC: 1.09 MG/DL (ref 0.76–1.27)
DEPRECATED RDW RBC AUTO: 39.7 FL (ref 37–54)
EGFRCR SERPLBLD CKD-EPI 2021: 91.3 ML/MIN/1.73
ERYTHROCYTE [DISTWIDTH] IN BLOOD BY AUTOMATED COUNT: 13.3 % (ref 12.3–15.4)
GLOBULIN UR ELPH-MCNC: 2.6 GM/DL
GLUCOSE SERPL-MCNC: 110 MG/DL (ref 65–99)
HBA1C MFR BLD: 5.9 % (ref 4.8–5.6)
HCT VFR BLD AUTO: 43.7 % (ref 37.5–51)
HDLC SERPL-MCNC: 37 MG/DL (ref 40–60)
HGB BLD-MCNC: 15.4 G/DL (ref 13–17.7)
LDLC SERPL CALC-MCNC: 124 MG/DL (ref 0–100)
LDLC/HDLC SERPL: 3.29 {RATIO}
MCH RBC QN AUTO: 29.4 PG (ref 26.6–33)
MCHC RBC AUTO-ENTMCNC: 35.2 G/DL (ref 31.5–35.7)
MCV RBC AUTO: 83.6 FL (ref 79–97)
PLATELET # BLD AUTO: 298 10*3/MM3 (ref 140–450)
PMV BLD AUTO: 10.2 FL (ref 6–12)
POTASSIUM SERPL-SCNC: 4.5 MMOL/L (ref 3.5–5.2)
PROT SERPL-MCNC: 7.3 G/DL (ref 6–8.5)
PSA SERPL-MCNC: 0.38 NG/ML (ref 0–4)
RBC # BLD AUTO: 5.23 10*6/MM3 (ref 4.14–5.8)
SODIUM SERPL-SCNC: 142 MMOL/L (ref 136–145)
TRIGL SERPL-MCNC: 116 MG/DL (ref 0–150)
VLDLC SERPL-MCNC: 21 MG/DL (ref 5–40)
WBC NRBC COR # BLD: 11.12 10*3/MM3 (ref 3.4–10.8)

## 2023-07-31 RX ORDER — EPINEPHRINE 0.3 MG/.3ML
0.3 INJECTION SUBCUTANEOUS SEE ADMIN INSTRUCTIONS
Qty: 2 EACH | Refills: 0 | Status: SHIPPED | OUTPATIENT
Start: 2023-07-31

## 2023-09-12 RX ORDER — FINASTERIDE 1 MG/1
1 TABLET, FILM COATED ORAL DAILY
Qty: 30 TABLET | Refills: 1 | Status: SHIPPED | OUTPATIENT
Start: 2023-09-12

## 2023-10-24 RX ORDER — OMEPRAZOLE 20 MG/1
20 CAPSULE, DELAYED RELEASE ORAL DAILY
Qty: 90 CAPSULE | Refills: 0 | Status: SHIPPED | OUTPATIENT
Start: 2023-10-24

## 2023-10-24 RX ORDER — FINASTERIDE 1 MG/1
1 TABLET, FILM COATED ORAL DAILY
Qty: 90 TABLET | Refills: 1 | Status: SHIPPED | OUTPATIENT
Start: 2023-10-24

## 2023-11-28 RX ORDER — METOPROLOL SUCCINATE 25 MG/1
25 TABLET, EXTENDED RELEASE ORAL DAILY
Qty: 90 TABLET | Refills: 1 | Status: SHIPPED | OUTPATIENT
Start: 2023-11-28

## 2024-01-23 ENCOUNTER — OFFICE VISIT (OUTPATIENT)
Dept: FAMILY MEDICINE CLINIC | Facility: CLINIC | Age: 34
End: 2024-01-23
Payer: COMMERCIAL

## 2024-01-23 VITALS
DIASTOLIC BLOOD PRESSURE: 80 MMHG | SYSTOLIC BLOOD PRESSURE: 122 MMHG | BODY MASS INDEX: 37.66 KG/M2 | OXYGEN SATURATION: 96 % | WEIGHT: 269 LBS | HEART RATE: 82 BPM | HEIGHT: 71 IN

## 2024-01-23 DIAGNOSIS — F90.0 ATTENTION DEFICIT HYPERACTIVITY DISORDER (ADHD), PREDOMINANTLY INATTENTIVE TYPE: ICD-10-CM

## 2024-01-23 DIAGNOSIS — G43.709 CHRONIC MIGRAINE WITHOUT AURA WITHOUT STATUS MIGRAINOSUS, NOT INTRACTABLE: ICD-10-CM

## 2024-01-23 DIAGNOSIS — L64.9 MALE PATTERN BALDNESS: ICD-10-CM

## 2024-01-23 DIAGNOSIS — I10 PRIMARY HYPERTENSION: ICD-10-CM

## 2024-01-23 DIAGNOSIS — E66.01 CLASS 2 SEVERE OBESITY WITH SERIOUS COMORBIDITY AND BODY MASS INDEX (BMI) OF 37.0 TO 37.9 IN ADULT, UNSPECIFIED OBESITY TYPE: Primary | ICD-10-CM

## 2024-01-23 DIAGNOSIS — R22.32 MASS OF LEFT AXILLA: ICD-10-CM

## 2024-01-23 DIAGNOSIS — F32.A ANXIETY AND DEPRESSION: ICD-10-CM

## 2024-01-23 DIAGNOSIS — R73.03 PREDIABETES: ICD-10-CM

## 2024-01-23 DIAGNOSIS — F41.9 ANXIETY AND DEPRESSION: ICD-10-CM

## 2024-01-23 PROCEDURE — 80050 GENERAL HEALTH PANEL: CPT | Performed by: NURSE PRACTITIONER

## 2024-01-23 PROCEDURE — 83036 HEMOGLOBIN GLYCOSYLATED A1C: CPT | Performed by: NURSE PRACTITIONER

## 2024-01-23 RX ORDER — SUMATRIPTAN 25 MG/1
TABLET, FILM COATED ORAL
Qty: 8 TABLET | Refills: 1 | Status: SHIPPED | OUTPATIENT
Start: 2024-01-23

## 2024-01-23 RX ORDER — CEPHALEXIN 500 MG/1
500 CAPSULE ORAL 2 TIMES DAILY
Qty: 14 CAPSULE | Refills: 0 | Status: SHIPPED | OUTPATIENT
Start: 2024-01-23

## 2024-01-23 RX ORDER — BUPROPION HYDROCHLORIDE 150 MG/1
300 TABLET ORAL DAILY
Qty: 180 TABLET | Refills: 1 | Status: SHIPPED | OUTPATIENT
Start: 2024-01-23 | End: 2024-04-22

## 2024-01-23 RX ORDER — MINOXIDIL 2.5 MG/1
1.25 TABLET ORAL DAILY
Qty: 15 TABLET | Refills: 1 | Status: SHIPPED | OUTPATIENT
Start: 2024-01-23 | End: 2024-02-22

## 2024-01-23 NOTE — PROGRESS NOTES
"Chief Complaint  Follow-up (6 month follow up HTN/depression ), Obesity (Discuss medication for weight loss ), and Mass (Lump under left armpit )    Subjective        Vasquez Stanford presents to [facility]  History of Present Illness    Patient presents for f/u visit.     Patient is c/o lump to left underarm, painful and red x 1 month. He tried to change deodorant without relief.     Patient has history of depression, anxiety and ADHD.  He is prescribed Wellbutrin 150 mg daily. Patient reports symptoms well controlled, has no acute complaints.      Patient is followed by cardiology regarding history of palpitations and chest discomfort.  He is taking metoprolol XL 25 mg daily.  Blood pressure is stable.  Patient denies dizziness, chest pain, cough or dyspnea.     Patient is concerned about obesity. He reports trying to improve diet, trying to increase exercise. Weight does not change. Patient is on WW, without change.     Hx migraines, taking Imitrex PRN. He reports migraines only occurring once every 1-2 months.     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  Patient does not necessarily notice an increase in hair loss but is not having any hair growth despite use of topical minoxidil.    Objective   Vital Signs:  /80 (BP Location: Left arm, Patient Position: Sitting, Cuff Size: Large Adult)   Pulse 82   Ht 180.3 cm (71\")   Wt 122 kg (269 lb)   SpO2 96%   BMI 37.52 kg/m²   Estimated body mass index is 37.52 kg/m² as calculated from the following:    Height as of this encounter: 180.3 cm (71\").    Weight as of this encounter: 122 kg (269 lb).           Physical Exam  Constitutional:       Appearance: Normal appearance. He is obese.   HENT:      Head: Normocephalic.   Cardiovascular:      Rate and Rhythm: Normal rate and regular rhythm.   Pulmonary:      Effort: Pulmonary effort is normal.      Breath sounds: " Normal breath sounds.   Abdominal:      General: Abdomen is flat. Bowel sounds are normal.      Palpations: Abdomen is soft.   Musculoskeletal:         General: Normal range of motion.      Cervical back: Neck supple.      Right lower leg: No edema.      Left lower leg: No edema.   Lymphadenopathy:      Upper Body:      Left upper body: Axillary adenopathy (mobile, mildly tender) present.   Skin:     General: Skin is warm and dry.   Neurological:      Mental Status: He is alert and oriented to person, place, and time.      Gait: Gait is intact.   Psychiatric:         Attention and Perception: Attention normal.         Mood and Affect: Mood normal.         Speech: Speech normal.        Result Review :      CMP          4/25/2023    15:16 1/23/2024    14:29   CMP   Glucose 110  123    BUN 12  13    Creatinine 1.09  0.83    EGFR 91.3  117.8    Sodium 142  138    Potassium 4.5  4.5    Chloride 107  103    Calcium 10.2  9.9    Total Protein 7.3  7.1    Albumin 4.7  4.6    Globulin 2.6  2.5    Total Bilirubin 0.3  0.3    Alkaline Phosphatase 91  99    AST (SGOT) 20  16    ALT (SGPT) 37  26    Albumin/Globulin Ratio 1.8  1.8    BUN/Creatinine Ratio 11.0  15.7    Anion Gap 11.0  11.1      CBC          4/25/2023    15:16 1/23/2024    14:29   CBC   WBC 11.12  10.77    RBC 5.23  5.17    Hemoglobin 15.4  15.0    Hematocrit 43.7  43.1    MCV 83.6  83.4    MCH 29.4  29.0    MCHC 35.2  34.8    RDW 13.3  13.0    Platelets 298  314      Lipid Panel          4/25/2023    15:16   Lipid Panel   Total Cholesterol 182    Triglycerides 116    HDL Cholesterol 37    VLDL Cholesterol 21    LDL Cholesterol  124    LDL/HDL Ratio 3.29      TSH          1/23/2024    14:29   TSH   TSH 1.550      Most Recent A1C          1/23/2024    14:29   HGBA1C Most Recent   Hemoglobin A1C 6.30                   Assessment and Plan     Diagnoses and all orders for this visit:    1. Class 2 severe obesity with serious comorbidity and body mass index (BMI) of  37.0 to 37.9 in adult, unspecified obesity type (Primary)  Assessment & Plan:  Patient's (Body mass index is 37.52 kg/m².) indicates that they are obese (BMI >30) with health conditions that include hypertension . Weight is unchanged. BMI  is above average; BMI management plan is completed. We discussed portion control, increasing exercise, management of depression/anxiety/stress to control compensatory eating, decreasing alcohol consumption, and pharmacologic options including discussed Contrave, as it bound and Wegovy.  Will check labs today and send recommendations via [employer] .     Orders:  -     TSH    2. Attention deficit hyperactivity disorder (ADHD), predominantly inattentive type    3. Primary hypertension  Assessment & Plan:  At goal of less than 140/90  Continue metoprolol as prescribed    Orders:  -     TSH    4. Anxiety and depression  Assessment & Plan:  Stable on Wellbutrin 150 mg daily    Orders:  -     buPROPion XL (WELLBUTRIN XL) 150 MG 24 hr tablet; Take 2 tablets by mouth Daily for 90 days.  Dispense: 180 tablet; Refill: 1    5. Male pattern baldness  Assessment & Plan:  Continue finasteride 1 mg daily  Add minoxidil 1.25 mg daily  Monitor blood pressure, call with dizziness or headaches  Consider dermatology referral    Orders:  -     minoxidil (LONITEN) 2.5 MG tablet; Take 0.5 tablets by mouth Daily for 30 days.  Dispense: 15 tablet; Refill: 1    6. Prediabetes  -     CBC (No Diff)  -     Comprehensive Metabolic Panel  -     Hemoglobin A1c  -     TSH    7. Mass of left axilla  Assessment & Plan:  Lymphadenopathy versus abscess  Warm compresses twice daily  Keflex x 7 days  If persists, refer for ultrasound    Orders:  -     cephalexin (Keflex) 500 MG capsule; Take 1 capsule by mouth 2 (Two) Times a Day.  Dispense: 14 capsule; Refill: 0    8. Chronic migraine without aura without status migrainosus, not intractable  -     SUMAtriptan (Imitrex) 25 MG tablet; Take one tablet at onset of headache.  May repeat dose one time in 2 hours if headache not relieved.  Dispense: 8 tablet; Refill: 1           I spent 30 minutes caring for Vasquez on this date of service. This time includes time spent by me in the following activities:preparing for the visit, reviewing tests, obtaining and/or reviewing a separately obtained history, performing a medically appropriate examination and/or evaluation , counseling and educating the patient/family/caregiver, ordering medications, tests, or procedures, documenting information in the medical record, and care coordination  Follow Up     Return in about 8 weeks (around 3/19/2024) for Alopecia/Obesity.  Patient was given instructions and counseling regarding his condition or for health maintenance advice. Please see specific information pulled into the AVS if appropriate.

## 2024-01-23 NOTE — PROGRESS NOTES
Venipuncture Blood Specimen Collection  Venipuncture performed in the right arm by Sue Asif MA with good hemostasis. Patient tolerated the procedure well without complications.   01/23/24   Sue Asif MA

## 2024-01-24 PROBLEM — E66.01 CLASS 2 SEVERE OBESITY WITH SERIOUS COMORBIDITY AND BODY MASS INDEX (BMI) OF 37.0 TO 37.9 IN ADULT: Status: ACTIVE | Noted: 2024-01-24

## 2024-01-24 PROBLEM — E66.812 CLASS 2 SEVERE OBESITY WITH SERIOUS COMORBIDITY AND BODY MASS INDEX (BMI) OF 37.0 TO 37.9 IN ADULT: Status: ACTIVE | Noted: 2024-01-24

## 2024-01-24 PROBLEM — R22.32 MASS OF LEFT AXILLA: Status: ACTIVE | Noted: 2024-01-24

## 2024-01-24 LAB
ALBUMIN SERPL-MCNC: 4.6 G/DL (ref 3.5–5.2)
ALBUMIN/GLOB SERPL: 1.8 G/DL
ALP SERPL-CCNC: 99 U/L (ref 39–117)
ALT SERPL W P-5'-P-CCNC: 26 U/L (ref 1–41)
ANION GAP SERPL CALCULATED.3IONS-SCNC: 11.1 MMOL/L (ref 5–15)
AST SERPL-CCNC: 16 U/L (ref 1–40)
BILIRUB SERPL-MCNC: 0.3 MG/DL (ref 0–1.2)
BUN SERPL-MCNC: 13 MG/DL (ref 6–20)
BUN/CREAT SERPL: 15.7 (ref 7–25)
CALCIUM SPEC-SCNC: 9.9 MG/DL (ref 8.6–10.5)
CHLORIDE SERPL-SCNC: 103 MMOL/L (ref 98–107)
CO2 SERPL-SCNC: 23.9 MMOL/L (ref 22–29)
CREAT SERPL-MCNC: 0.83 MG/DL (ref 0.76–1.27)
DEPRECATED RDW RBC AUTO: 38.5 FL (ref 37–54)
EGFRCR SERPLBLD CKD-EPI 2021: 117.8 ML/MIN/1.73
ERYTHROCYTE [DISTWIDTH] IN BLOOD BY AUTOMATED COUNT: 13 % (ref 12.3–15.4)
GLOBULIN UR ELPH-MCNC: 2.5 GM/DL
GLUCOSE SERPL-MCNC: 123 MG/DL (ref 65–99)
HBA1C MFR BLD: 6.3 % (ref 4.8–5.6)
HCT VFR BLD AUTO: 43.1 % (ref 37.5–51)
HGB BLD-MCNC: 15 G/DL (ref 13–17.7)
MCH RBC QN AUTO: 29 PG (ref 26.6–33)
MCHC RBC AUTO-ENTMCNC: 34.8 G/DL (ref 31.5–35.7)
MCV RBC AUTO: 83.4 FL (ref 79–97)
PLATELET # BLD AUTO: 314 10*3/MM3 (ref 140–450)
PMV BLD AUTO: 10.1 FL (ref 6–12)
POTASSIUM SERPL-SCNC: 4.5 MMOL/L (ref 3.5–5.2)
PROT SERPL-MCNC: 7.1 G/DL (ref 6–8.5)
RBC # BLD AUTO: 5.17 10*6/MM3 (ref 4.14–5.8)
SODIUM SERPL-SCNC: 138 MMOL/L (ref 136–145)
TSH SERPL DL<=0.05 MIU/L-ACNC: 1.55 UIU/ML (ref 0.27–4.2)
WBC NRBC COR # BLD AUTO: 10.77 10*3/MM3 (ref 3.4–10.8)

## 2024-01-24 NOTE — ASSESSMENT & PLAN NOTE
Patient's (Body mass index is 37.52 kg/m².) indicates that they are obese (BMI >30) with health conditions that include hypertension . Weight is unchanged. BMI  is above average; BMI management plan is completed. We discussed portion control, increasing exercise, management of depression/anxiety/stress to control compensatory eating, decreasing alcohol consumption, and pharmacologic options including discussed Contrave, as it bound and Wegovy.  Will check labs today and send recommendations via [employer] .

## 2024-01-24 NOTE — ASSESSMENT & PLAN NOTE
Lymphadenopathy versus abscess  Warm compresses twice daily  Keflex x 7 days  If persists, refer for ultrasound

## 2024-01-24 NOTE — ASSESSMENT & PLAN NOTE
Continue finasteride 1 mg daily  Add minoxidil 1.25 mg daily  Monitor blood pressure, call with dizziness or headaches  Consider dermatology referral

## 2024-02-26 ENCOUNTER — TELEPHONE (OUTPATIENT)
Dept: FAMILY MEDICINE CLINIC | Facility: CLINIC | Age: 34
End: 2024-02-26
Payer: COMMERCIAL

## 2024-02-26 NOTE — TELEPHONE ENCOUNTER
Caller: FIDELIA TRUJILLO    Relationship: Emergency Contact    Best call back number: [phone]    What medication are you requesting: REPLACEMENT FOR SUMAtriptan (Imitrex) 25 MG tablet    What are your current symptoms: PT HAS A BAD MIGRAINE. PT'S WIFE STATES THIS IS THE WORST ONE HE HAS HAD IN A LONG TIME. CURRENT PRESCRIPTION IS NO LONGER HELPING SYMPTOMS.     If a prescription is needed, what is your preferred pharmacy and phone number: [id number] - [place], [place] - [address] - [phone]  - [phone] FX

## 2024-05-25 DIAGNOSIS — F41.9 ANXIETY AND DEPRESSION: ICD-10-CM

## 2024-05-25 DIAGNOSIS — F32.A ANXIETY AND DEPRESSION: ICD-10-CM

## 2024-05-28 RX ORDER — BUPROPION HYDROCHLORIDE 150 MG/1
300 TABLET ORAL DAILY
Qty: 180 TABLET | Refills: 1 | Status: SHIPPED | OUTPATIENT
Start: 2024-05-28 | End: 2024-11-24

## 2024-05-28 RX ORDER — CEPHALEXIN 500 MG/1
500 CAPSULE ORAL 2 TIMES DAILY
Qty: 14 CAPSULE | Refills: 0 | Status: SHIPPED | OUTPATIENT
Start: 2024-05-28

## 2024-05-28 RX ORDER — OMEPRAZOLE 20 MG/1
20 CAPSULE, DELAYED RELEASE ORAL DAILY
Qty: 90 CAPSULE | Refills: 0 | Status: SHIPPED | OUTPATIENT
Start: 2024-05-28

## 2024-05-28 RX ORDER — FINASTERIDE 1 MG/1
1 TABLET, FILM COATED ORAL DAILY
Qty: 90 TABLET | Refills: 1 | Status: SHIPPED | OUTPATIENT
Start: 2024-05-28

## 2024-05-28 RX ORDER — METOPROLOL SUCCINATE 25 MG/1
25 TABLET, EXTENDED RELEASE ORAL DAILY
Qty: 90 TABLET | Refills: 1 | Status: SHIPPED | OUTPATIENT
Start: 2024-05-28

## 2024-05-31 RX ORDER — SEMAGLUTIDE 0.25 MG/.5ML
0.25 INJECTION, SOLUTION SUBCUTANEOUS WEEKLY
Qty: 2 ML | Refills: 0 | Status: SHIPPED | OUTPATIENT
Start: 2024-05-31 | End: 2024-06-30

## 2024-07-03 RX ORDER — SEMAGLUTIDE 0.25 MG/.5ML
0.25 INJECTION, SOLUTION SUBCUTANEOUS WEEKLY
Qty: 2 ML | Refills: 0 | Status: SHIPPED | OUTPATIENT
Start: 2024-07-03 | End: 2024-07-03

## 2024-07-03 RX ORDER — SEMAGLUTIDE 0.25 MG/.5ML
0.25 INJECTION, SOLUTION SUBCUTANEOUS WEEKLY
Qty: 2 ML | Refills: 0 | Status: SHIPPED | OUTPATIENT
Start: 2024-07-03 | End: 2024-08-02

## 2024-09-16 DIAGNOSIS — L64.9 MALE PATTERN BALDNESS: ICD-10-CM

## 2024-09-19 RX ORDER — SEMAGLUTIDE 0.5 MG/.5ML
0.5 INJECTION, SOLUTION SUBCUTANEOUS WEEKLY
Qty: 2 ML | Refills: 0 | Status: SHIPPED | OUTPATIENT
Start: 2024-09-19 | End: 2024-10-19

## 2024-09-20 RX ORDER — METOPROLOL SUCCINATE 25 MG/1
25 TABLET, EXTENDED RELEASE ORAL DAILY
Qty: 90 TABLET | Refills: 1 | Status: SHIPPED | OUTPATIENT
Start: 2024-09-20

## 2024-09-20 RX ORDER — MINOXIDIL 2.5 MG/1
1.25 TABLET ORAL DAILY
Qty: 15 TABLET | Refills: 1 | Status: SHIPPED | OUTPATIENT
Start: 2024-09-20 | End: 2024-10-20

## 2024-11-05 RX ORDER — SEMAGLUTIDE 0.5 MG/.5ML
0.5 INJECTION, SOLUTION SUBCUTANEOUS WEEKLY
Qty: 2 ML | Refills: 0 | Status: SHIPPED | OUTPATIENT
Start: 2024-11-05 | End: 2024-12-05

## 2024-11-24 DIAGNOSIS — L64.9 MALE PATTERN BALDNESS: ICD-10-CM

## 2024-11-25 RX ORDER — FINASTERIDE 1 MG/1
1 TABLET, FILM COATED ORAL DAILY
Qty: 90 TABLET | Refills: 1 | Status: SHIPPED | OUTPATIENT
Start: 2024-11-25

## 2024-11-25 RX ORDER — MINOXIDIL 2.5 MG/1
1.25 TABLET ORAL DAILY
Qty: 15 TABLET | Refills: 1 | Status: SHIPPED | OUTPATIENT
Start: 2024-11-25 | End: 2024-12-25

## 2024-11-27 ENCOUNTER — OFFICE VISIT (OUTPATIENT)
Dept: FAMILY MEDICINE CLINIC | Facility: CLINIC | Age: 34
End: 2024-11-27
Payer: COMMERCIAL

## 2024-11-27 VITALS
SYSTOLIC BLOOD PRESSURE: 124 MMHG | HEIGHT: 71 IN | BODY MASS INDEX: 33.74 KG/M2 | HEART RATE: 81 BPM | DIASTOLIC BLOOD PRESSURE: 80 MMHG | OXYGEN SATURATION: 96 % | WEIGHT: 241 LBS

## 2024-11-27 DIAGNOSIS — R68.82 LOW LIBIDO: ICD-10-CM

## 2024-11-27 DIAGNOSIS — Z00.00 PREVENTATIVE HEALTH CARE: ICD-10-CM

## 2024-11-27 DIAGNOSIS — E66.811 CLASS 1 OBESITY WITH SERIOUS COMORBIDITY AND BODY MASS INDEX (BMI) OF 33.0 TO 33.9 IN ADULT, UNSPECIFIED OBESITY TYPE: ICD-10-CM

## 2024-11-27 DIAGNOSIS — R22.32 MASS OF LEFT AXILLA: ICD-10-CM

## 2024-11-27 DIAGNOSIS — F90.0 ATTENTION DEFICIT HYPERACTIVITY DISORDER (ADHD), PREDOMINANTLY INATTENTIVE TYPE: ICD-10-CM

## 2024-11-27 DIAGNOSIS — F41.9 ANXIETY AND DEPRESSION: ICD-10-CM

## 2024-11-27 DIAGNOSIS — F32.A ANXIETY AND DEPRESSION: ICD-10-CM

## 2024-11-27 DIAGNOSIS — R73.03 PREDIABETES: ICD-10-CM

## 2024-11-27 DIAGNOSIS — L64.9 MALE PATTERN BALDNESS: Primary | ICD-10-CM

## 2024-11-27 DIAGNOSIS — L98.9 LESION OF SKIN OF NOSE: ICD-10-CM

## 2024-11-27 LAB
DEPRECATED RDW RBC AUTO: 39.9 FL (ref 37–54)
ERYTHROCYTE [DISTWIDTH] IN BLOOD BY AUTOMATED COUNT: 13 % (ref 12.3–15.4)
HBA1C MFR BLD: 5.5 % (ref 4.8–5.6)
HCT VFR BLD AUTO: 46.7 % (ref 37.5–51)
HGB BLD-MCNC: 15.5 G/DL (ref 13–17.7)
MCH RBC QN AUTO: 28.2 PG (ref 26.6–33)
MCHC RBC AUTO-ENTMCNC: 33.2 G/DL (ref 31.5–35.7)
MCV RBC AUTO: 85.1 FL (ref 79–97)
PLATELET # BLD AUTO: 340 10*3/MM3 (ref 140–450)
PMV BLD AUTO: 10 FL (ref 6–12)
RBC # BLD AUTO: 5.49 10*6/MM3 (ref 4.14–5.8)
WBC NRBC COR # BLD AUTO: 12.36 10*3/MM3 (ref 3.4–10.8)

## 2024-11-27 PROCEDURE — 36415 COLL VENOUS BLD VENIPUNCTURE: CPT | Performed by: NURSE PRACTITIONER

## 2024-11-27 PROCEDURE — 84402 ASSAY OF FREE TESTOSTERONE: CPT | Performed by: NURSE PRACTITIONER

## 2024-11-27 PROCEDURE — 80050 GENERAL HEALTH PANEL: CPT | Performed by: NURSE PRACTITIONER

## 2024-11-27 PROCEDURE — 83036 HEMOGLOBIN GLYCOSYLATED A1C: CPT | Performed by: NURSE PRACTITIONER

## 2024-11-27 PROCEDURE — 84403 ASSAY OF TOTAL TESTOSTERONE: CPT | Performed by: NURSE PRACTITIONER

## 2024-11-27 PROCEDURE — 80061 LIPID PANEL: CPT | Performed by: NURSE PRACTITIONER

## 2024-11-27 NOTE — PROGRESS NOTES
Venipuncture Blood Specimen Collection  Venipuncture performed in the right arm by Sue Asif MA with good hemostasis. Patient tolerated the procedure well without complications.   11/27/24   Sue Asif MA

## 2024-11-27 NOTE — ASSESSMENT & PLAN NOTE
Patient's (Body mass index is 33.61 kg/m².) indicates that they are obese (BMI >30) with health conditions that include hypertension, dyslipidemias, and GERD . Weight is improving with treatment. BMI  is above average; BMI management plan is completed. We discussed portion control, increasing exercise, management of depression/anxiety/stress to control compensatory eating, decreasing alcohol consumption, and pharmacologic options including continue semaglutide 0.5 mg weekly .

## 2024-11-27 NOTE — PROGRESS NOTES
"Chief Complaint  Follow-up (Follow up obesity/ would like to discuss testosterone levels and treatment ), Suspicious Skin Lesion (On nose that is not going away), and Mass (Lump underarm that has not gotten better )    Subjective        Vasquez Stanford presents to [facility]  History of Present Illness    Patient presents for follow-up visit.    Obesity, current BMI is 33.61.  Patient is prescribed Wegovy 0.5 mg weekly.  Total weight loss of 28 pounds. Goal weight is 220 pounds. Patient reports good portion control, working on healthy choices. He is not exercising regularly.     Patient has history of depression, anxiety and ADHD.  He is prescribed Wellbutrin 150 mg daily. Patient reports symptoms well controlled, has no acute complaints.      Patient is followed by cardiology regarding history of palpitations and chest discomfort.  He is taking metoprolol XL 25 mg daily.  Blood pressure is stable.  Patient denies dizziness, chest pain, cough or dyspnea.     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  Patient is wanting his testosterone levels checked due to decreased libido and fatigue.    Patient is complaining of skin lesion to his nose. He reports present x 2 months - unchanged in size, shape.     Patient also complaining of lump to his left axilla. It was previously treated with Keflex and warm compresses in January, unchanged.       Objective   Vital Signs:  /80 (BP Location: Left arm, Patient Position: Sitting, Cuff Size: Adult)   Pulse 81   Ht 180.3 cm (71\")   Wt 109 kg (241 lb)   SpO2 96%   BMI 33.61 kg/m²   Estimated body mass index is 33.61 kg/m² as calculated from the following:    Height as of this encounter: 180.3 cm (71\").    Weight as of this encounter: 109 kg (241 lb).          Physical Exam  Constitutional:       Appearance: Normal appearance.   HENT:      Head: Normocephalic.      Nose:        Comments: Nonhealing scabbed " lesion  Cardiovascular:      Rate and Rhythm: Normal rate and regular rhythm.   Pulmonary:      Effort: Pulmonary effort is normal.      Breath sounds: Normal breath sounds.   Abdominal:      General: Abdomen is flat. Bowel sounds are normal.      Palpations: Abdomen is soft.   Musculoskeletal:         General: Normal range of motion.      Cervical back: Neck supple.      Right lower leg: No edema.      Left lower leg: No edema.   Lymphadenopathy:      Upper Body:      Left upper body: Axillary adenopathy present.   Skin:     General: Skin is warm and dry.   Neurological:      Mental Status: He is alert and oriented to person, place, and time.      Gait: Gait is intact.   Psychiatric:         Attention and Perception: Attention normal.         Mood and Affect: Mood normal.         Speech: Speech normal.        Result Review :                Assessment and Plan   Diagnoses and all orders for this visit:    1. Male pattern baldness (Primary)  Assessment & Plan:  Stable on current treatment regimen    Orders:  -     Testosterone (Free & Total), LC / MS    2. Anxiety and depression  Assessment & Plan:  Patient's depression is a recurrent episode that is mild without psychosis. Depression is in full remission and stable.    Plan:   Continue current medication therapy     Followup in 6 months.       3. Class 1 obesity with serious comorbidity and body mass index (BMI) of 33.0 to 33.9 in adult, unspecified obesity type  Assessment & Plan:  Patient's (Body mass index is 33.61 kg/m².) indicates that they are obese (BMI >30) with health conditions that include hypertension, dyslipidemias, and GERD . Weight is improving with treatment. BMI  is above average; BMI management plan is completed. We discussed portion control, increasing exercise, management of depression/anxiety/stress to control compensatory eating, decreasing alcohol consumption, and pharmacologic options including continue semaglutide 0.5 mg weekly .       4.  Attention deficit hyperactivity disorder (ADHD), predominantly inattentive type  Assessment & Plan:  Psychological condition is stable.  Continue current treatment regimen.  Psychological condition  will be reassessed in 6 months.      5. Prediabetes  -     Hemoglobin A1c    6. Lesion of skin of nose  -     Ambulatory Referral to Dermatology    7. Mass of left axilla  -     US Soft Tissue; Future    8. Low libido  -     Testosterone (Free & Total), LC / MS    9. Preventative health care  -     CBC (No Diff)  -     Comprehensive Metabolic Panel  -     Hemoglobin A1c  -     Lipid Panel  -     TSH    Other orders  -     Tdap Vaccine => 6yo IM (BOOSTRIX/ADACEL)           I spent 30 minutes caring for Vasquez on this date of service. This time includes time spent by me in the following activities:preparing for the visit, reviewing tests, obtaining and/or reviewing a separately obtained history, performing a medically appropriate examination and/or evaluation , counseling and educating the patient/family/caregiver, ordering medications, tests, or procedures, referring and communicating with other health care professionals , documenting information in the medical record, and care coordination  Follow Up   Return in about 6 months (around 5/27/2025) for Annual physical.  Patient was given instructions and counseling regarding his condition or for health maintenance advice. Please see specific information pulled into the AVS if appropriate.

## 2024-11-27 NOTE — ASSESSMENT & PLAN NOTE
"  2020      RE: Kota Braxton  [address]  [facility] [address]       Referring Physician: Aleks Easton   CC:   Chief Complaint   Patient presents with     Consult     Here today for a hemangioma           HPI:   We had the pleasure of seeing Kota in our Pediatric Dermatology clinic today, in consultation from Aleks Easton for evaluation of hemangioma. Pt born three weeks premature via . They first noticed a flat red spot on forehead when patient was 2 weeks of age. It has since grown and has become raised. No hx of bleeding. No other birthmarks or hemangiomas.     Past Medical/Surgical History: Born @37 weeks via   Family History: No family history of skin cancer  Social History: Lives with both parents  Medications:   No current outpatient medications on file.      Allergies: No Known Allergies   ROS: a 10 point review of systems including constitutional, HEENT, CV, GI, musculoskeletal, Neurologic, Endocrine, Respiratory, Hematologic and Allergic/Immunologic was performed and was negative except for the following:   Physical examination: BP 98/66 (BP Location: Left leg, Patient Position: Other (comments), Cuff Size:  Size #4)   Pulse 154   Ht 1' 9.26\" (54 cm)   Wt 4.6 kg (10 lb 2.3 oz)   BMI 15.77 kg/m      General: Well-developed, well-nourished in no apparent distress.  Patient was breathing comfortably on room air. Extremities were warm and well-perfused without edema. There was no clubbing or cyanosis, nails normal. Normal mood and affect.    Skin: A complete skin examination and palpation of skin and subcutaneous tissues of the scalp, eyebrows, face, chest, back, abdomen and upper and lower extremities was performed and was normal except as noted below:  Small 6 mm well demarcated bright red vascular papule located on forehead  Yellow superficial scale of the scalp, glabella  In office labs or procedures performed today: " Stable on current treatment regimen     None  Assessment/Plan  Infantile hemangioma, small and superficial: I discussed the natural history of infantile hemangiomas with the family today. Typically, hemangiomas are not present, or, present as precursor lesions at birth. They tend to grow rapidly over the first few weeks to months of life but in some cases can continue to grow for up to one year.  Most hemangiomas then undergo involution, and slowly involute over 5-10 years. Depending on the size, location and complications related to the hemangioma, treatments may be considered. Treatments include topical or oral beta blockers such as propranolol.However, in this patient, given the small size, localized nature and lack of complications, no systemic treatment is necessary. Occasionally, hemangiomas may leave a small scar,  telangiectasias or fibrofatty residuum following involution. If this occurs, additional treatment could be considered.   Discussed with patient the role for Timolol in helping to lighten/resolve the hemangioma. Pt would like to start this at this time. Start Timolol 0.5% gel form solution.   Follow-up in 4-6 weeks.   Thank you for allowing us to participate in Poth's care.  Scribed by Yosef Odell MS4, for Dr. Bennett.      Yosef Odell, MS4, completed the family history, social history and ROS today. This student acted as my scribe for other portions of this encounter.  The encounter documented above was completely performed by myself and accurately depicts my evaluation, diagnoses, decisions, treatment and follow-up plans.      I was present with the medical student who participated in the service and in the documentation of the note.  I have verified the history and personally performed the physical exam and medical decision making.  I agree with the assessment and plan of care as documented in the note.    Betzy Bennett MD  Pediatric Dermatology Staff      Copy:  Aleks Easton MD  [facility]  [address]  [place] [address]

## 2024-11-28 LAB
ALBUMIN SERPL-MCNC: 4.4 G/DL (ref 3.5–5.2)
ALBUMIN/GLOB SERPL: 1.4 G/DL
ALP SERPL-CCNC: 109 U/L (ref 39–117)
ALT SERPL W P-5'-P-CCNC: 25 U/L (ref 1–41)
ANION GAP SERPL CALCULATED.3IONS-SCNC: 9.9 MMOL/L (ref 5–15)
AST SERPL-CCNC: 17 U/L (ref 1–40)
BILIRUB SERPL-MCNC: 0.3 MG/DL (ref 0–1.2)
BUN SERPL-MCNC: 11 MG/DL (ref 6–20)
BUN/CREAT SERPL: 11.6 (ref 7–25)
CALCIUM SPEC-SCNC: 10.2 MG/DL (ref 8.6–10.5)
CHLORIDE SERPL-SCNC: 104 MMOL/L (ref 98–107)
CHOLEST SERPL-MCNC: 171 MG/DL (ref 0–200)
CO2 SERPL-SCNC: 23.1 MMOL/L (ref 22–29)
CREAT SERPL-MCNC: 0.95 MG/DL (ref 0.76–1.27)
EGFRCR SERPLBLD CKD-EPI 2021: 107 ML/MIN/1.73
GLOBULIN UR ELPH-MCNC: 3.1 GM/DL
GLUCOSE SERPL-MCNC: 89 MG/DL (ref 65–99)
HDLC SERPL-MCNC: 28 MG/DL (ref 40–60)
LDLC SERPL CALC-MCNC: 116 MG/DL (ref 0–100)
LDLC/HDLC SERPL: 4.04 {RATIO}
POTASSIUM SERPL-SCNC: 4.3 MMOL/L (ref 3.5–5.2)
PROT SERPL-MCNC: 7.5 G/DL (ref 6–8.5)
SODIUM SERPL-SCNC: 137 MMOL/L (ref 136–145)
TRIGL SERPL-MCNC: 149 MG/DL (ref 0–150)
TSH SERPL DL<=0.05 MIU/L-ACNC: 0.89 UIU/ML (ref 0.27–4.2)
VLDLC SERPL-MCNC: 27 MG/DL (ref 5–40)

## 2024-12-05 LAB
TESTOST FREE SERPL-MCNC: 12.6 PG/ML (ref 8.7–25.1)
TESTOST SERPL-MCNC: 626.7 NG/DL (ref 264–916)

## 2024-12-06 ENCOUNTER — PATIENT MESSAGE (OUTPATIENT)
Dept: FAMILY MEDICINE CLINIC | Facility: CLINIC | Age: 34
End: 2024-12-06
Payer: COMMERCIAL

## 2024-12-13 DIAGNOSIS — F41.9 ANXIETY AND DEPRESSION: ICD-10-CM

## 2024-12-13 DIAGNOSIS — F32.A ANXIETY AND DEPRESSION: ICD-10-CM

## 2024-12-13 DIAGNOSIS — L64.9 MALE PATTERN BALDNESS: ICD-10-CM

## 2024-12-16 RX ORDER — SEMAGLUTIDE 0.5 MG/.5ML
0.5 INJECTION, SOLUTION SUBCUTANEOUS WEEKLY
Qty: 2 ML | Refills: 0 | Status: SHIPPED | OUTPATIENT
Start: 2024-12-16 | End: 2024-12-18 | Stop reason: SDUPTHER

## 2024-12-16 RX ORDER — MINOXIDIL 2.5 MG/1
1.25 TABLET ORAL DAILY
Qty: 15 TABLET | Refills: 1 | Status: SHIPPED | OUTPATIENT
Start: 2024-12-16 | End: 2025-01-15

## 2024-12-16 RX ORDER — METOPROLOL SUCCINATE 25 MG/1
25 TABLET, EXTENDED RELEASE ORAL DAILY
Qty: 90 TABLET | Refills: 1 | Status: SHIPPED | OUTPATIENT
Start: 2024-12-16

## 2024-12-16 RX ORDER — FINASTERIDE 1 MG/1
1 TABLET, FILM COATED ORAL DAILY
Qty: 90 TABLET | Refills: 1 | Status: SHIPPED | OUTPATIENT
Start: 2024-12-16

## 2024-12-16 RX ORDER — BUPROPION HYDROCHLORIDE 150 MG/1
300 TABLET ORAL DAILY
Qty: 180 TABLET | Refills: 1 | Status: SHIPPED | OUTPATIENT
Start: 2024-12-16 | End: 2025-06-14

## 2024-12-18 RX ORDER — SEMAGLUTIDE 0.5 MG/.5ML
0.5 INJECTION, SOLUTION SUBCUTANEOUS WEEKLY
Qty: 2 ML | Refills: 0 | Status: SHIPPED | OUTPATIENT
Start: 2024-12-18 | End: 2025-01-17

## 2024-12-20 NOTE — TELEPHONE ENCOUNTER
LVM regarding US order. Inquiring if has been scheduled or has been completed, Gave phone number to [facility] at [phone] to call and schedule    Relay

## 2025-01-20 RX ORDER — SEMAGLUTIDE 0.5 MG/.5ML
0.5 INJECTION, SOLUTION SUBCUTANEOUS WEEKLY
Qty: 2 ML | Refills: 0 | Status: SHIPPED | OUTPATIENT
Start: 2025-01-20 | End: 2025-02-19

## 2025-02-17 RX ORDER — SEMAGLUTIDE 1 MG/.5ML
1 INJECTION, SOLUTION SUBCUTANEOUS WEEKLY
Qty: 2 ML | Refills: 0 | Status: SHIPPED | OUTPATIENT
Start: 2025-02-17 | End: 2025-03-19

## 2025-02-24 DIAGNOSIS — L64.9 MALE PATTERN BALDNESS: ICD-10-CM

## 2025-02-25 RX ORDER — MINOXIDIL 2.5 MG/1
1.25 TABLET ORAL DAILY
Qty: 15 TABLET | Refills: 1 | Status: SHIPPED | OUTPATIENT
Start: 2025-02-25 | End: 2025-03-27

## 2025-04-02 DIAGNOSIS — L64.9 MALE PATTERN BALDNESS: ICD-10-CM

## 2025-04-02 DIAGNOSIS — F32.A ANXIETY AND DEPRESSION: ICD-10-CM

## 2025-04-02 DIAGNOSIS — G43.709 CHRONIC MIGRAINE WITHOUT AURA WITHOUT STATUS MIGRAINOSUS, NOT INTRACTABLE: ICD-10-CM

## 2025-04-02 DIAGNOSIS — F41.9 ANXIETY AND DEPRESSION: ICD-10-CM

## 2025-04-02 RX ORDER — SEMAGLUTIDE 1 MG/.5ML
1 INJECTION, SOLUTION SUBCUTANEOUS WEEKLY
Qty: 2 ML | Refills: 0 | Status: CANCELLED | OUTPATIENT
Start: 2025-04-02 | End: 2025-05-02

## 2025-04-03 RX ORDER — METOPROLOL SUCCINATE 25 MG/1
25 TABLET, EXTENDED RELEASE ORAL DAILY
Qty: 90 TABLET | Refills: 1 | Status: SHIPPED | OUTPATIENT
Start: 2025-04-03

## 2025-04-03 RX ORDER — OMEPRAZOLE 20 MG/1
20 CAPSULE, DELAYED RELEASE ORAL DAILY
Qty: 90 CAPSULE | Refills: 0 | Status: SHIPPED | OUTPATIENT
Start: 2025-04-03

## 2025-04-03 RX ORDER — SUMATRIPTAN SUCCINATE 25 MG/1
TABLET ORAL
Qty: 8 TABLET | Refills: 1 | Status: SHIPPED | OUTPATIENT
Start: 2025-04-03

## 2025-04-03 RX ORDER — BUPROPION HYDROCHLORIDE 150 MG/1
300 TABLET ORAL DAILY
Qty: 180 TABLET | Refills: 1 | Status: SHIPPED | OUTPATIENT
Start: 2025-04-03 | End: 2025-09-30

## 2025-04-03 RX ORDER — MINOXIDIL 2.5 MG/1
1.25 TABLET ORAL DAILY
Qty: 15 TABLET | Refills: 1 | Status: SHIPPED | OUTPATIENT
Start: 2025-04-03 | End: 2025-05-03

## 2025-04-03 RX ORDER — FINASTERIDE 1 MG/1
1 TABLET, FILM COATED ORAL DAILY
Qty: 90 TABLET | Refills: 1 | Status: SHIPPED | OUTPATIENT
Start: 2025-04-03

## 2025-04-03 RX ORDER — SEMAGLUTIDE 1 MG/.5ML
1 INJECTION, SOLUTION SUBCUTANEOUS WEEKLY
Qty: 2 ML | Refills: 0 | Status: SHIPPED | OUTPATIENT
Start: 2025-04-03 | End: 2025-05-03

## 2025-04-08 RX ORDER — SEMAGLUTIDE 1 MG/.5ML
1 INJECTION, SOLUTION SUBCUTANEOUS WEEKLY
Qty: 6 ML | Refills: 0 | Status: SHIPPED | OUTPATIENT
Start: 2025-04-08 | End: 2025-07-07

## 2025-06-03 ENCOUNTER — OFFICE VISIT (OUTPATIENT)
Dept: FAMILY MEDICINE CLINIC | Facility: CLINIC | Age: 35
End: 2025-06-03
Payer: COMMERCIAL

## 2025-06-03 VITALS
HEART RATE: 74 BPM | HEIGHT: 71 IN | SYSTOLIC BLOOD PRESSURE: 128 MMHG | OXYGEN SATURATION: 98 % | BODY MASS INDEX: 32.34 KG/M2 | WEIGHT: 231 LBS | DIASTOLIC BLOOD PRESSURE: 80 MMHG

## 2025-06-03 DIAGNOSIS — L64.9 MALE PATTERN BALDNESS: ICD-10-CM

## 2025-06-03 DIAGNOSIS — F32.A ANXIETY AND DEPRESSION: ICD-10-CM

## 2025-06-03 DIAGNOSIS — E66.811 CLASS 1 OBESITY WITH SERIOUS COMORBIDITY AND BODY MASS INDEX (BMI) OF 33.0 TO 33.9 IN ADULT, UNSPECIFIED OBESITY TYPE: ICD-10-CM

## 2025-06-03 DIAGNOSIS — K21.9 GASTROESOPHAGEAL REFLUX DISEASE, UNSPECIFIED WHETHER ESOPHAGITIS PRESENT: ICD-10-CM

## 2025-06-03 DIAGNOSIS — F41.9 ANXIETY AND DEPRESSION: ICD-10-CM

## 2025-06-03 DIAGNOSIS — F90.0 ATTENTION DEFICIT HYPERACTIVITY DISORDER (ADHD), PREDOMINANTLY INATTENTIVE TYPE: ICD-10-CM

## 2025-06-03 DIAGNOSIS — G43.709 CHRONIC MIGRAINE WITHOUT AURA WITHOUT STATUS MIGRAINOSUS, NOT INTRACTABLE: ICD-10-CM

## 2025-06-03 DIAGNOSIS — Z00.00 ANNUAL PHYSICAL EXAM: ICD-10-CM

## 2025-06-03 DIAGNOSIS — E55.9 VITAMIN D DEFICIENCY: ICD-10-CM

## 2025-06-03 DIAGNOSIS — R73.03 PREDIABETES: ICD-10-CM

## 2025-06-03 DIAGNOSIS — I10 PRIMARY HYPERTENSION: Primary | ICD-10-CM

## 2025-06-03 PROCEDURE — 82607 VITAMIN B-12: CPT | Performed by: NURSE PRACTITIONER

## 2025-06-03 PROCEDURE — 83036 HEMOGLOBIN GLYCOSYLATED A1C: CPT | Performed by: NURSE PRACTITIONER

## 2025-06-03 PROCEDURE — 80050 GENERAL HEALTH PANEL: CPT | Performed by: NURSE PRACTITIONER

## 2025-06-03 PROCEDURE — 36415 COLL VENOUS BLD VENIPUNCTURE: CPT | Performed by: NURSE PRACTITIONER

## 2025-06-03 PROCEDURE — 82306 VITAMIN D 25 HYDROXY: CPT | Performed by: NURSE PRACTITIONER

## 2025-06-03 NOTE — ASSESSMENT & PLAN NOTE
Labs today  Routine vision and dental screenings advised  Well balanced diet recommended  CDC recommends 150 minutes exercise weekly

## 2025-06-03 NOTE — PROGRESS NOTES
Venipuncture Blood Specimen Collection  Venipuncture performed in the right arm by Sue Asif MA with good hemostasis. Patient tolerated the procedure well without complications.   06/03/25   Sue Asif MA

## 2025-06-03 NOTE — PROGRESS NOTES
Chief Complaint  Annual Exam and Migraine (Discuss Botox options )    Subjective        Vasquez Stanford presents to [facility]  History of Present Illness    Patient presents for Annual Exam. PMH includes hypertension, prediabetes, obesity, GERD, ADHD, depression, anxiety and migraine headaches.    Obesity, current BMI is 32.22.  Patient is prescribed Wegovy 1 mg weekly.  Total weight loss of 38 pounds. Goal weight is 220 pounds. Patient reports good portion control, working on healthy choices. He is not exercising regularly.      Patient has history of depression, anxiety and ADHD.  He is prescribed Wellbutrin 150 mg daily. Patient reports symptoms well controlled, has no acute complaints.      Patient previously followed by Cardiology regarding history of palpitations and chest discomfort.  He is taking metoprolol XL 25 mg daily.  Blood pressure is stable.  Patient denies dizziness, chest pain, cough or dyspnea.      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  Patient is wanting his testosterone levels checked due to decreased libido and fatigue.    Patient has migraine headaches, taking Imitrex PRN. He reports increasing frequency and severity, describes one migraine weekly. Patient also reports getting a migraine every time the neighbor mows.     GERD, stable on Prilosec 20 mg daily.     PHQ-9 Depression Screening  Little interest or pleasure in doing things? Not at all   Feeling down, depressed, or hopeless? Not at all   PHQ-2 Total Score 0   Trouble falling or staying asleep, or sleeping too much?     Feeling tired or having little energy?     Poor appetite or overeating?     Feeling bad about yourself - or that you are a failure or have let yourself or your family down?     Trouble concentrating on things, such as reading the newspaper or watching television?     Moving or speaking so slowly that other people could have noticed? Or the opposite - being so fidgety  "or restless that you have been moving around a lot more than usual?       Thoughts that you would be better off dead, or of hurting yourself in some way?     PHQ-9 Total Score     If you checked off any problems, how difficult have these problems made it for you to do your work, take care of things at home, or get along with other people? Not difficult at all        Objective   Vital Signs:  /80 (BP Location: Left arm, Patient Position: Sitting, Cuff Size: Adult)   Pulse 74   Ht 180.3 cm (71\")   Wt 105 kg (231 lb)   SpO2 98%   BMI 32.22 kg/m²   Estimated body mass index is 32.22 kg/m² as calculated from the following:    Height as of this encounter: 180.3 cm (71\").    Weight as of this encounter: 105 kg (231 lb).          Physical Exam  Constitutional:       Appearance: Normal appearance.   HENT:      Head: Normocephalic.      Right Ear: Tympanic membrane normal.      Left Ear: Tympanic membrane normal.      Mouth/Throat:      Pharynx: Oropharynx is clear.   Cardiovascular:      Rate and Rhythm: Normal rate and regular rhythm.   Pulmonary:      Effort: Pulmonary effort is normal.      Breath sounds: Normal breath sounds.   Abdominal:      General: Abdomen is flat. Bowel sounds are normal.      Palpations: Abdomen is soft.   Musculoskeletal:         General: Normal range of motion.      Cervical back: Neck supple.      Right lower leg: No edema.      Left lower leg: No edema.   Skin:     General: Skin is warm and dry.   Neurological:      Mental Status: He is alert and oriented to person, place, and time.      Gait: Gait is intact.   Psychiatric:         Attention and Perception: Attention normal.         Mood and Affect: Mood normal.         Speech: Speech normal.        Result Review :    CMP          11/27/2024    14:22   CMP   Glucose 89    BUN 11    Creatinine 0.95    EGFR 107.0    Sodium 137    Potassium 4.3    Chloride 104    Calcium 10.2    Total Protein 7.5    Albumin 4.4    Globulin 3.1    Total " Bilirubin 0.3    Alkaline Phosphatase 109    AST (SGOT) 17    ALT (SGPT) 25    Albumin/Globulin Ratio 1.4    BUN/Creatinine Ratio 11.6    Anion Gap 9.9      CBC          11/27/2024    14:22   CBC   WBC 12.36    RBC 5.49    Hemoglobin 15.5    Hematocrit 46.7    MCV 85.1    MCH 28.2    MCHC 33.2    RDW 13.0    Platelets 340      Lipid Panel          11/27/2024    14:22   Lipid Panel   Total Cholesterol 171    Triglycerides 149    HDL Cholesterol 28    VLDL Cholesterol 27    LDL Cholesterol  116    LDL/HDL Ratio 4.04      TSH          11/27/2024    14:22   TSH   TSH 0.885      Most Recent A1C          11/27/2024    14:22   HGBA1C Most Recent   Hemoglobin A1C 5.50                Assessment and Plan   Diagnoses and all orders for this visit:    1. Primary hypertension (Primary)  Assessment & Plan:  Hypertension is stable and controlled  Continue current treatment regimen.  Regular aerobic exercise.  Ambulatory blood pressure monitoring.  Blood pressure will be reassessed in 6 months.      2. Prediabetes  -     Hemoglobin A1c    3. Class 1 obesity with serious comorbidity and body mass index (BMI) of 33.0 to 33.9 in adult, unspecified obesity type  Assessment & Plan:  Patient's (Body mass index is 32.22 kg/m².) indicates that they are obese (BMI >30) with health conditions that include hypertension, dyslipidemias, and GERD . Weight is improving with treatment. BMI  is above average; BMI management plan is completed. We discussed portion control, increasing exercise, joining a fitness center or start home based exercise program, Weight Watchers or other Commercial based weight reduction program, management of depression/anxiety/stress to control compensatory eating, decreasing alcohol consumption, and pharmacologic options including Wegovy 1 mg weekly.       4. Anxiety and depression  Assessment & Plan:  Patient's depression is a recurrent episode that is mild without psychosis. Depression is in partial remission and  stable.    Plan:   Continue current medication therapy     Followup at the next regular appointment.       5. Attention deficit hyperactivity disorder (ADHD), predominantly inattentive type  Assessment & Plan:  Psychological condition is stable.  Continue current treatment regimen.  Psychological condition  will be reassessed at the next regular appointment.      6. Male pattern baldness  Assessment & Plan:  Stable on treatment regimen      7. Chronic migraine without aura without status migrainosus, not intractable  Assessment & Plan:  Headaches are worsening.    Plan:  Sample Nurtec and Ubrelvy - call with response. Did discuss referral to Neurology for Botox.     Discussed medication dosage, use, side effects, and goals of treatment in detail.    Discussed monitoring symptoms and use of quick-relief medications and maintenance medication.    General Treatment Goals:   symptom prevention  minimize work absence  minimizing limitation in activity  prevention of exacerbations  decrease use of ER/inpatient care  minimization of adverse effects of treatment    Followup at the next regular appointment                Orders:  -     Vitamin D,25-Hydroxy  -     Vitamin B12    8. Annual physical exam  Assessment & Plan:  Labs today  Routine vision and dental screenings advised  Well balanced diet recommended  CDC recommends 150 minutes exercise weekly    Orders:  -     CBC (No Diff)  -     Comprehensive Metabolic Panel  -     Hemoglobin A1c  -     TSH    9. Vitamin D deficiency  -     Vitamin D,25-Hydroxy    10. Gastroesophageal reflux disease, unspecified whether esophagitis present  Assessment & Plan:  Stable on Prilosec 20 mg daily             I spent 30 minutes caring for Vasquez on this date of service. This time includes time spent by me in the following activities:preparing for the visit, reviewing tests, obtaining and/or reviewing a separately obtained history, performing a medically appropriate examination and/or  evaluation , counseling and educating the patient/family/caregiver, ordering medications, tests, or procedures, documenting information in the medical record, and care coordination  Follow Up   Return in about 6 months (around 12/3/2025) for Migraine Headaches.  Patient was given instructions and counseling regarding his condition or for health maintenance advice. Please see specific information pulled into the AVS if appropriate.     Counseling was given to patient for the following topics: diagnostic results, instructions for management, risk factor reductions, prognosis, patient and family education, impressions, risks and benefits of treatment options, and importance of treatment compliance . Total time of the encounter was 25 minutes and 5 minutes was spent counseling.

## 2025-06-03 NOTE — ASSESSMENT & PLAN NOTE
Headaches are worsening.    Plan:  Sample Nurtec and Ubrelvy - call with response. Did discuss referral to Neurology for Botox.     Discussed medication dosage, use, side effects, and goals of treatment in detail.    Discussed monitoring symptoms and use of quick-relief medications and maintenance medication.    General Treatment Goals:   symptom prevention  minimize work absence  minimizing limitation in activity  prevention of exacerbations  decrease use of ER/inpatient care  minimization of adverse effects of treatment    Followup at the next regular appointment

## 2025-06-03 NOTE — ASSESSMENT & PLAN NOTE
Patient's (Body mass index is 32.22 kg/m².) indicates that they are obese (BMI >30) with health conditions that include hypertension, dyslipidemias, and GERD . Weight is improving with treatment. BMI  is above average; BMI management plan is completed. We discussed portion control, increasing exercise, joining a fitness center or start home based exercise program, Weight Watchers or other Commercial based weight reduction program, management of depression/anxiety/stress to control compensatory eating, decreasing alcohol consumption, and pharmacologic options including Wegovy 1 mg weekly.

## 2025-06-04 LAB
25(OH)D3 SERPL-MCNC: 22.3 NG/ML (ref 30–100)
ALBUMIN SERPL-MCNC: 4.5 G/DL (ref 3.5–5.2)
ALBUMIN/GLOB SERPL: 1.6 G/DL
ALP SERPL-CCNC: 93 U/L (ref 39–117)
ALT SERPL W P-5'-P-CCNC: 23 U/L (ref 1–41)
ANION GAP SERPL CALCULATED.3IONS-SCNC: 9.4 MMOL/L (ref 5–15)
AST SERPL-CCNC: 15 U/L (ref 1–40)
BILIRUB SERPL-MCNC: 0.3 MG/DL (ref 0–1.2)
BUN SERPL-MCNC: 12 MG/DL (ref 6–20)
BUN/CREAT SERPL: 15 (ref 7–25)
CALCIUM SPEC-SCNC: 9.8 MG/DL (ref 8.6–10.5)
CHLORIDE SERPL-SCNC: 106 MMOL/L (ref 98–107)
CO2 SERPL-SCNC: 23.6 MMOL/L (ref 22–29)
CREAT SERPL-MCNC: 0.8 MG/DL (ref 0.76–1.27)
DEPRECATED RDW RBC AUTO: 41.6 FL (ref 37–54)
EGFRCR SERPLBLD CKD-EPI 2021: 117.6 ML/MIN/1.73
ERYTHROCYTE [DISTWIDTH] IN BLOOD BY AUTOMATED COUNT: 13 % (ref 12.3–15.4)
GLOBULIN UR ELPH-MCNC: 2.8 GM/DL
GLUCOSE SERPL-MCNC: 87 MG/DL (ref 65–99)
HBA1C MFR BLD: 5.4 % (ref 4.8–5.6)
HCT VFR BLD AUTO: 46.7 % (ref 37.5–51)
HGB BLD-MCNC: 15.7 G/DL (ref 13–17.7)
MCH RBC QN AUTO: 29.9 PG (ref 26.6–33)
MCHC RBC AUTO-ENTMCNC: 33.6 G/DL (ref 31.5–35.7)
MCV RBC AUTO: 89 FL (ref 79–97)
PLATELET # BLD AUTO: 312 10*3/MM3 (ref 140–450)
PMV BLD AUTO: 9.9 FL (ref 6–12)
POTASSIUM SERPL-SCNC: 4.4 MMOL/L (ref 3.5–5.2)
PROT SERPL-MCNC: 7.3 G/DL (ref 6–8.5)
RBC # BLD AUTO: 5.25 10*6/MM3 (ref 4.14–5.8)
SODIUM SERPL-SCNC: 139 MMOL/L (ref 136–145)
TSH SERPL DL<=0.05 MIU/L-ACNC: 1.43 UIU/ML (ref 0.27–4.2)
VIT B12 BLD-MCNC: 740 PG/ML (ref 211–946)
WBC NRBC COR # BLD AUTO: 10.66 10*3/MM3 (ref 3.4–10.8)

## 2025-06-04 RX ORDER — MINOXIDIL 2.5 MG/1
1.25 TABLET ORAL DAILY
Qty: 15 TABLET | Refills: 1 | Status: SHIPPED | OUTPATIENT
Start: 2025-06-04 | End: 2025-07-04

## 2025-06-17 RX ORDER — SEMAGLUTIDE 1 MG/.5ML
1 INJECTION, SOLUTION SUBCUTANEOUS WEEKLY
Qty: 6 ML | Refills: 0 | Status: SHIPPED | OUTPATIENT
Start: 2025-06-17 | End: 2025-09-15

## 2025-07-15 RX ORDER — SEMAGLUTIDE 1 MG/.5ML
1 INJECTION, SOLUTION SUBCUTANEOUS WEEKLY
Qty: 6 ML | Refills: 0 | Status: SHIPPED | OUTPATIENT
Start: 2025-07-15 | End: 2025-07-15 | Stop reason: SDUPTHER

## 2025-07-15 RX ORDER — SEMAGLUTIDE 1 MG/.5ML
1 INJECTION, SOLUTION SUBCUTANEOUS WEEKLY
Qty: 6 ML | Refills: 0 | Status: SHIPPED | OUTPATIENT
Start: 2025-07-15 | End: 2025-10-13

## 2025-07-16 ENCOUNTER — PRIOR AUTHORIZATION (OUTPATIENT)
Dept: FAMILY MEDICINE CLINIC | Facility: CLINIC | Age: 35
End: 2025-07-16
Payer: COMMERCIAL

## 2025-08-10 DIAGNOSIS — L64.9 MALE PATTERN BALDNESS: ICD-10-CM

## 2025-08-11 RX ORDER — FINASTERIDE 1 MG/1
1 TABLET, FILM COATED ORAL DAILY
Qty: 90 TABLET | Refills: 1 | Status: SHIPPED | OUTPATIENT
Start: 2025-08-11

## 2025-08-11 RX ORDER — MINOXIDIL 2.5 MG/1
1.25 TABLET ORAL DAILY
Qty: 15 TABLET | Refills: 1 | Status: SHIPPED | OUTPATIENT
Start: 2025-08-11 | End: 2025-09-10

## 2025-08-19 RX ORDER — FINASTERIDE 1 MG/1
1 TABLET, FILM COATED ORAL DAILY
Qty: 90 TABLET | Refills: 1 | OUTPATIENT
Start: 2025-08-19